# Patient Record
Sex: FEMALE | Race: ASIAN | NOT HISPANIC OR LATINO | Employment: FULL TIME | URBAN - METROPOLITAN AREA
[De-identification: names, ages, dates, MRNs, and addresses within clinical notes are randomized per-mention and may not be internally consistent; named-entity substitution may affect disease eponyms.]

---

## 2019-07-09 ENCOUNTER — RECORDS - HEALTHEAST (OUTPATIENT)
Dept: LAB | Facility: CLINIC | Age: 34
End: 2019-07-09

## 2019-07-09 LAB
ABO/RH(D): NORMAL
ABORH REPEAT: NORMAL
HCG SERPL-ACNC: 1532 MLU/ML (ref 0–4)

## 2019-07-11 ENCOUNTER — RECORDS - HEALTHEAST (OUTPATIENT)
Dept: LAB | Facility: CLINIC | Age: 34
End: 2019-07-11

## 2019-07-11 ENCOUNTER — RECORDS - HEALTHEAST (OUTPATIENT)
Dept: ADMINISTRATIVE | Facility: OTHER | Age: 34
End: 2019-07-11

## 2019-07-11 LAB — HCG SERPL-ACNC: 4085 MLU/ML (ref 0–4)

## 2019-07-12 ENCOUNTER — HOSPITAL ENCOUNTER (OUTPATIENT)
Dept: ULTRASOUND IMAGING | Facility: CLINIC | Age: 34
Discharge: HOME | End: 2019-07-12
Attending: OBSTETRICS & GYNECOLOGY

## 2019-07-12 ENCOUNTER — COMMUNICATION - HEALTHEAST (OUTPATIENT)
Dept: TELEHEALTH | Facility: CLINIC | Age: 34
End: 2019-07-12

## 2019-07-12 DIAGNOSIS — O20.9 BLEEDING IN EARLY PREGNANCY: ICD-10-CM

## 2019-08-09 ENCOUNTER — RECORDS - HEALTHEAST (OUTPATIENT)
Dept: ADMINISTRATIVE | Facility: OTHER | Age: 34
End: 2019-08-09

## 2019-08-29 ENCOUNTER — RECORDS - HEALTHEAST (OUTPATIENT)
Dept: ADMINISTRATIVE | Facility: OTHER | Age: 34
End: 2019-08-29

## 2019-08-29 LAB
HPV_EXT - HISTORICAL: NORMAL
PAP SMEAR - HIM PATIENT REPORTED: NORMAL

## 2019-10-10 ENCOUNTER — AMBULATORY - HEALTHEAST (OUTPATIENT)
Dept: MATERNAL FETAL MEDICINE | Facility: HOSPITAL | Age: 34
End: 2019-10-10

## 2019-10-10 DIAGNOSIS — O26.90 PREGNANCY, ANTEPARTUM, COMPLICATIONS: ICD-10-CM

## 2019-10-18 ENCOUNTER — AMBULATORY - HEALTHEAST (OUTPATIENT)
Dept: MATERNAL FETAL MEDICINE | Facility: HOSPITAL | Age: 34
End: 2019-10-18

## 2019-10-23 ENCOUNTER — OFFICE VISIT - HEALTHEAST (OUTPATIENT)
Dept: MATERNAL FETAL MEDICINE | Facility: HOSPITAL | Age: 34
End: 2019-10-23

## 2019-10-23 ENCOUNTER — RECORDS - HEALTHEAST (OUTPATIENT)
Dept: ADMINISTRATIVE | Facility: OTHER | Age: 34
End: 2019-10-23

## 2019-10-23 ENCOUNTER — RECORDS - HEALTHEAST (OUTPATIENT)
Dept: ULTRASOUND IMAGING | Facility: HOSPITAL | Age: 34
End: 2019-10-23

## 2019-10-23 DIAGNOSIS — O09.512 SUPERVISION OF ELDERLY PRIMIGRAVIDA IN SECOND TRIMESTER: ICD-10-CM

## 2019-10-23 DIAGNOSIS — O09.522 MULTIGRAVIDA OF ADVANCED MATERNAL AGE IN SECOND TRIMESTER: ICD-10-CM

## 2019-10-23 DIAGNOSIS — O26.90 PREGNANCY RELATED CONDITIONS, UNSPECIFIED, UNSPECIFIED TRIMESTER: ICD-10-CM

## 2019-10-23 DIAGNOSIS — O26.90 PREGNANCY, ANTEPARTUM, COMPLICATIONS: ICD-10-CM

## 2019-10-24 ENCOUNTER — RECORDS - HEALTHEAST (OUTPATIENT)
Dept: ADMINISTRATIVE | Facility: OTHER | Age: 34
End: 2019-10-24

## 2019-10-24 LAB — HBA1C MFR BLD: 4.9 % (ref 0–5.6)

## 2019-12-20 ENCOUNTER — RECORDS - HEALTHEAST (OUTPATIENT)
Dept: ADMINISTRATIVE | Facility: OTHER | Age: 34
End: 2019-12-20

## 2020-02-06 ENCOUNTER — RECORDS - HEALTHEAST (OUTPATIENT)
Dept: ADMINISTRATIVE | Facility: OTHER | Age: 35
End: 2020-02-06

## 2020-02-06 ENCOUNTER — RECORDS - HEALTHEAST (OUTPATIENT)
Dept: LAB | Facility: CLINIC | Age: 35
End: 2020-02-06

## 2020-02-06 LAB
ALT SERPL W P-5'-P-CCNC: 15 U/L (ref 0–45)
AST SERPL W P-5'-P-CCNC: 21 U/L (ref 0–40)
BASOPHILS # BLD AUTO: 0 THOU/UL (ref 0–0.2)
BASOPHILS NFR BLD AUTO: 0 % (ref 0–2)
CREAT SERPL-MCNC: 0.54 MG/DL (ref 0.6–1.1)
CREAT UR-MCNC: 82.1 MG/DL
EOSINOPHIL # BLD AUTO: 0 THOU/UL (ref 0–0.4)
EOSINOPHIL NFR BLD AUTO: 1 % (ref 0–6)
ERYTHROCYTE [DISTWIDTH] IN BLOOD BY AUTOMATED COUNT: 13.1 % (ref 11–14.5)
GFR SERPL CREATININE-BSD FRML MDRD: >60 ML/MIN/1.73M2
HCT VFR BLD AUTO: 37.9 % (ref 35–47)
HGB BLD-MCNC: 12.9 G/DL (ref 12–16)
LYMPHOCYTES # BLD AUTO: 1.5 THOU/UL (ref 0.8–4.4)
LYMPHOCYTES NFR BLD AUTO: 19 % (ref 20–40)
MCH RBC QN AUTO: 31.9 PG (ref 27–34)
MCHC RBC AUTO-ENTMCNC: 34 G/DL (ref 32–36)
MCV RBC AUTO: 94 FL (ref 80–100)
MONOCYTES # BLD AUTO: 0.6 THOU/UL (ref 0–0.9)
MONOCYTES NFR BLD AUTO: 7 % (ref 2–10)
NEUTROPHILS # BLD AUTO: 5.6 THOU/UL (ref 2–7.7)
NEUTROPHILS NFR BLD AUTO: 72 % (ref 50–70)
PLATELET # BLD AUTO: 204 THOU/UL (ref 140–440)
PMV BLD AUTO: 10.3 FL (ref 8.5–12.5)
PROTEIN, RANDOM URINE - HISTORICAL: 25 MG/DL
PROTEIN/CREAT RATIO, RANDOM UR: 0.3
RBC # BLD AUTO: 4.04 MILL/UL (ref 3.8–5.4)
URATE SERPL-MCNC: 4.3 MG/DL (ref 2–7.5)
WBC: 7.9 THOU/UL (ref 4–11)

## 2020-02-14 ENCOUNTER — RECORDS - HEALTHEAST (OUTPATIENT)
Dept: ADMINISTRATIVE | Facility: OTHER | Age: 35
End: 2020-02-14

## 2020-03-12 ENCOUNTER — RECORDS - HEALTHEAST (OUTPATIENT)
Dept: ADMINISTRATIVE | Facility: OTHER | Age: 35
End: 2020-03-12

## 2020-03-18 ASSESSMENT — MIFFLIN-ST. JEOR: SCORE: 1364.01

## 2020-03-20 ENCOUNTER — ANESTHESIA - HEALTHEAST (OUTPATIENT)
Dept: OBGYN | Facility: CLINIC | Age: 35
End: 2020-03-20

## 2020-03-20 ENCOUNTER — SURGERY - HEALTHEAST (OUTPATIENT)
Dept: OBGYN | Facility: CLINIC | Age: 35
End: 2020-03-20

## 2020-04-03 ENCOUNTER — RECORDS - HEALTHEAST (OUTPATIENT)
Dept: ADMINISTRATIVE | Facility: OTHER | Age: 35
End: 2020-04-03

## 2020-05-01 ENCOUNTER — RECORDS - HEALTHEAST (OUTPATIENT)
Dept: ADMINISTRATIVE | Facility: OTHER | Age: 35
End: 2020-05-01

## 2020-09-10 ENCOUNTER — RECORDS - HEALTHEAST (OUTPATIENT)
Dept: ADMINISTRATIVE | Facility: OTHER | Age: 35
End: 2020-09-10

## 2020-09-10 LAB
HPV_EXT - HISTORICAL: NORMAL
PAP SMEAR - HIM PATIENT REPORTED: NORMAL

## 2020-10-16 ENCOUNTER — OFFICE VISIT - HEALTHEAST (OUTPATIENT)
Dept: FAMILY MEDICINE | Facility: CLINIC | Age: 35
End: 2020-10-16

## 2020-10-16 DIAGNOSIS — Z13.228 SCREENING FOR METABOLIC DISORDER: ICD-10-CM

## 2020-10-16 DIAGNOSIS — Z00.00 ROUTINE GENERAL MEDICAL EXAMINATION AT A HEALTH CARE FACILITY: ICD-10-CM

## 2020-10-16 DIAGNOSIS — Z23 NEEDS FLU SHOT: ICD-10-CM

## 2020-10-16 DIAGNOSIS — Z12.4 SCREENING FOR CERVICAL CANCER: ICD-10-CM

## 2020-10-16 LAB
CHOLEST SERPL-MCNC: 212 MG/DL
FASTING STATUS PATIENT QL REPORTED: YES
HDLC SERPL-MCNC: 56 MG/DL
LDLC SERPL CALC-MCNC: 103 MG/DL
TRIGL SERPL-MCNC: 264 MG/DL

## 2020-10-16 ASSESSMENT — PATIENT HEALTH QUESTIONNAIRE - PHQ9: SUM OF ALL RESPONSES TO PHQ QUESTIONS 1-9: 2

## 2020-10-16 ASSESSMENT — ANXIETY QUESTIONNAIRES
7. FEELING AFRAID AS IF SOMETHING AWFUL MIGHT HAPPEN: NOT AT ALL
1. FEELING NERVOUS, ANXIOUS, OR ON EDGE: NOT AT ALL
2. NOT BEING ABLE TO STOP OR CONTROL WORRYING: SEVERAL DAYS
5. BEING SO RESTLESS THAT IT IS HARD TO SIT STILL: NOT AT ALL
4. TROUBLE RELAXING: NOT AT ALL
IF YOU CHECKED OFF ANY PROBLEMS ON THIS QUESTIONNAIRE, HOW DIFFICULT HAVE THESE PROBLEMS MADE IT FOR YOU TO DO YOUR WORK, TAKE CARE OF THINGS AT HOME, OR GET ALONG WITH OTHER PEOPLE: NOT DIFFICULT AT ALL
6. BECOMING EASILY ANNOYED OR IRRITABLE: NOT AT ALL
3. WORRYING TOO MUCH ABOUT DIFFERENT THINGS: NOT AT ALL
GAD7 TOTAL SCORE: 1

## 2020-10-16 ASSESSMENT — MIFFLIN-ST. JEOR: SCORE: 1244.03

## 2020-10-17 LAB — HBA1C MFR BLD: 5.5 %

## 2020-11-10 ENCOUNTER — RECORDS - HEALTHEAST (OUTPATIENT)
Dept: HEALTH INFORMATION MANAGEMENT | Facility: CLINIC | Age: 35
End: 2020-11-10

## 2021-05-27 ASSESSMENT — PATIENT HEALTH QUESTIONNAIRE - PHQ9: SUM OF ALL RESPONSES TO PHQ QUESTIONS 1-9: 2

## 2021-05-28 ASSESSMENT — ANXIETY QUESTIONNAIRES: GAD7 TOTAL SCORE: 1

## 2021-06-02 NOTE — PROGRESS NOTES
[facility]  Genetic Counseling Consult    Patient:  Felicia Mcallister YOB: 1985   Date of Service:  10/23/2019      Felicia Mcallister was seen at the [facility] for genetic consultation as part of her appointment for comprehensive ultrasound.  The indication for genetic counseling is advanced maternal age. Felicia was accompanied to today's appointment by the father of the pregnancy, Armando.       Impression/Plan:   1. Felicia had a cell-free fetal DNA test earlier in pregnancy, which was normal.    2. Felicia had a comprehensive (level II) ultrasound today.  Please see the ultrasound report for further details.    3. The patient declines genetic amniocentesis and additional maternal serum screening today.    Pregnancy History:   /Parity:    Age at Delivery: 34 y.o.  KAIT: 3/11/2020, by Last Menstrual Period  Gestational Age: 20w0d    No significant complications or exposures were reported in the current pregnancy.    Medical History:   Felicia simpson reported medical history is not expected to impact pregnancy management or risks to fetal development.       Family History:   A three-generation pedigree was obtained, and is scanned under the  Media  tab.   The following significant findings were reported by Felicia and Armando:    Armando reports that one of his nieces has a form of dwarfism and was born with a heart defect that she had to have surgically corrected when she was 3 months old. Armando was uncertain which form of dwarfism his niece has. We discussed that there are several forms of dwarfism and they have different inheritance patterns. The most common form of dwarfism, achondroplasia, is an autosomal dominant condition. It is most often de danial, with no family history of the condition. If this is the form Armando's niece has, it would not confer an increased risk to the current pregnancy. If Armando's niece has an  autosomal recessive form of dwarfism, there would be an increased chance for him to be a carrier for the condition and therefore a potential increased risk for the pregnancy to be affected. If more information is obtained regarding Armando's niece's diagnosis this should be revisited.     Otherwise, the reported family history is negative for multiple miscarriages, stillbirths, birth defects, intellectual disability, known genetic conditions, and consanguinity.       Carrier Screening:   The patient reports that the father of the pregnancy has  ancestry:     Cystic fibrosis is an autosomal recessive genetic condition that occurs with increased frequency in individuals of  ancestry and carrier screening for this condition is available.  In addition,  screening in the [facility] includes cystic fibrosis.    The patient reports that she is of  ancestry:      The hemoglobinopathies are a group of genetic blood diseases that occur with increased frequency in individuals of  ancestry and carrier screening for these conditions is available.  Carrier screening for the hemoglobinopathies includes a CBC with red blood cell indices, a ferritin level, and a quantitative hemoglobin electrophoresis or HPLC.  In addition,  screening in the [facility] includes many of the hemoglobinopathies.      Expanded carrier screening for mutations in a large panel of genes associated with autosomal recessive conditions including cystic fibrosis, spinal muscular atrophy, and others, is now available.      Carrier screening was beyond the scope of our discussion today.        Risk Assessment for Chromosome Conditions:   We explained that the risk for fetal chromosome abnormalities increases with maternal age. We discussed specific features of common chromosome abnormalities, including Down syndrome, trisomy 13, trisomy 18, and sex chromosome trisomies.      - At age 33 at midtrimester, the  risk to have a baby with Down syndrome is 1 in 274.     - At age 33 at midtrimester, the risk to have a baby with any chromosome abnormality is 1 in 135.       Felicia had maternal serum screening earlier in pregnancy.    Non-invasive Prenatal Testing (NIPT)    Maternal plasma cell-free DNA testing    Screens for fetal trisomy 21, trisomy 13, trisomy 18, and sex chromosome aneuploidy    First trimester ultrasound with nuchal translucency and nasal bone assessment was not performed in this pregnancy, to our knowledge.    Felicia had NIPT through [facility] earlier in pregnancy; we reviewed the results today, which are normal for chromosome 13, chromosome 18 and chromosome 21 (no aneuploidy detected)    Given the accuracy of this test, these results greatly decrease the chance for certain fetal chromosome abnormalities    We discussed the limitations of normal NIPT results    MSAFP (after 15 weeks for open neural tube defect screening) results were not available for our review today.         Testing Options:   We discussed the following options:   Non-invasive Prenatal Testing (NIPT)    Maternal plasma cell-free DNA testing; first trimester ultrasound with nuchal translucency and nasal bone assessment is recommended, when appropriate    Screens for fetal trisomy 21, trisomy 13, trisomy 18, and sex chromosome aneuploidy    Cannot screen for open neural tube defects; maternal serum AFP after 15 weeks is recommended       Comprehensive (Level II) ultrasound: Detailed ultrasound performed between 18-22 weeks gestation to screen for major birth defects and markers for aneuploidy.        We reviewed the benefits and limitations of this testing.  Screening tests provide a risk assessment specific to the pregnancy for certain fetal chromosome abnormalities, but cannot definitively diagnose or exclude a fetal chromosome abnormality.  Follow-up genetic counseling and consideration of diagnostic testing is recommended with  any abnormal screening result.     Diagnostic tests carry inherent risks- including risk of miscarriage- that require careful consideration.  These tests can detect fetal chromosome abnormalities with greater than 99% certainty.  Results can be compromised by maternal cell contamination or mosaicism, and are limited by the resolution of cytogenetic G-banding technology.  There is no screening nor diagnostic test that can detect all forms of birth defects or mental disability.    It was a pleasure to be involved with Felicia [facility]. Face-to-face time of the meeting was 25 minutes.      Sienna Zurita MS, [facility]  Licensed Genetic Counselor   [facility]   Maternal Fetal Medicine Centers  [email or web address]   [place] Office: [phone]   [facility] [phone]  [facility] Office: [phone]  Pager: [phone] Fax: [phone]

## 2021-06-02 NOTE — PROGRESS NOTES
"Please see \"Imaging\" tab under Chart Review for full report.  This ultrasound was performed in the [facility], and may be located under Care Everywhere.    Nicolasa Fuentes MD  Maternal Fetal Medicine    "

## 2021-06-04 VITALS — BODY MASS INDEX: 32.79 KG/M2 | WEIGHT: 167 LBS | HEIGHT: 60 IN

## 2021-06-05 VITALS
BODY MASS INDEX: 26.21 KG/M2 | HEIGHT: 61 IN | HEART RATE: 67 BPM | TEMPERATURE: 97.9 F | DIASTOLIC BLOOD PRESSURE: 70 MMHG | WEIGHT: 138.8 LBS | SYSTOLIC BLOOD PRESSURE: 100 MMHG

## 2021-06-07 NOTE — ANESTHESIA CARE TRANSFER NOTE
Last vitals:   Vitals:    20 0900   BP: 93/54   Pulse: 85   Resp: 16   Temp: 36.9  C (98.4  F)   SpO2:      Please see postop note from .  Duplicate note

## 2021-06-07 NOTE — ANESTHESIA POSTPROCEDURE EVALUATION
Patient: Felicia Mcallister  Procedure(s):   SECTION  Anesthesia type: regional    Patient location: PACU  Last vitals:   Vitals Value Taken Time   /55 3/20/2020  6:15 PM   Temp 37.3  C (99.1  F) 3/20/2020  5:33 PM   Pulse 77 3/20/2020  6:15 PM   Resp 14 3/20/2020  6:15 PM   SpO2 96 % 3/20/2020  6:15 PM     Post vital signs: stable  Level of consciousness: awake and responds to simple questions  Post-anesthesia pain: pain controlled  Post-anesthesia nausea and vomiting: no  Pulmonary: unassisted, return to baseline  Cardiovascular: stable and blood pressure at baseline  Hydration: adequate  Anesthetic events: no    QCDR Measures:  ASA# 11 - Alena-op Cardiac Arrest: ASA11B - Patient did NOT experience unanticipated cardiac arrest  ASA# 12 - Alena-op Mortality Rate: ASA12B - Patient did NOT die  ASA# 13 - PACU Re-Intubation Rate: NA - No ETT / LMA used for case  ASA# 10 - Composite Anes Safety: ASA10A - No serious adverse event    Additional Notes:

## 2021-06-07 NOTE — ANESTHESIA PROCEDURE NOTES
Peripheral Block    Patient location during procedure: OR  Start time: 3/20/2020 5:11 PM  End time: 3/20/2020 5:19 PM  post-op analgesia per surgeon order as noted in medical record  Staffing:  Performing  Anesthesiologist: Nicolasa Magaña MD  Preanesthetic Checklist  Completed: patient identified, site marked, risks, benefits, and alternatives discussed, timeout performed, consent obtained, airway assessed, oxygen available, suction available, emergency drugs available and hand hygiene performed  Peripheral Block  Block type: other, TAP  Prep: ChloraPrep  Patient position: supine  Patient monitoring: cardiac monitor, continuous pulse oximetry, heart rate and blood pressure  Laterality: bilateral  Injection technique: ultrasound guided    Ultrasound used to visualize needle placement in proximity to nerve being blocked: yes   US used to visualize anesthetic spread  Visualized anatomic structures normal  No Pathological Findings  Permanent ultrasound image captured for medical record  Sterile gel and probe cover used for ultrasound.  Needle  Needle type: Stimuplex   Needle gauge: 20G  Needle length: 4 in  no peripheral nerve catheter placed  Assessment  Injection assessment: no difficulty with injection

## 2021-06-07 NOTE — ANESTHESIA CARE TRANSFER NOTE
Last vitals:   Vitals:    03/20/20 1733   BP: 102/49   Pulse: 75   Resp: 12   Temp: 37.3  C (99.1  F)   SpO2: 96%     Patient's level of consciousness is awake  Spontaneous respirations: yes  Maintains airway independently: yes  Dentition unchanged: yes  Oropharynx: oropharynx clear of all foreign objects    QCDR Measures:  ASA# 20 - Surgical Safety Checklist: WHO surgical safety checklist completed prior to induction    PQRS# 430 - Adult PONV Prevention: 4558F - Pt received => 2 anti-emetic agents (different classes) preop & intraop  ASA# 8 - Peds PONV Prevention: NA - Not pediatric patient, not GA or 2 or more risk factors NOT present  PQRS# 424 - Alena-op Temp Management: NA - MAC anesthesia or case < 60 minutes  PQRS# 426 - PACU Transfer Protocol: - Transfer of care checklist used  ASA# 14 - Acute Post-op Pain: ASA14B - Patient did NOT experience pain >= 7 out of 10

## 2021-06-07 NOTE — ANESTHESIA PREPROCEDURE EVALUATION
Anesthesia Evaluation      Patient summary reviewed   No history of anesthetic complications     Airway   Mallampati: III  Neck ROM: full   Pulmonary - negative ROS and normal exam                          Cardiovascular - negative ROS and normal exam   Neuro/Psych - negative ROS     Endo/Other - negative ROS      GI/Hepatic/Renal - negative ROS           Dental - normal exam                        Anesthesia Plan  Planned anesthetic: epidural    ASA 2     Anesthetic plan and risks discussed with: patient and spouse    Post-op plan: routine recovery

## 2021-06-07 NOTE — ANESTHESIA PROCEDURE NOTES
Epidural Block    Patient location during procedure: OB  Time Called: 3/20/2020 4:28 AM  Reason for Block:labor epidural  Staffing:  Performing  Anesthesiologist: Kevin Bailey MD  Preanesthetic Checklist  Completed: patient identified, risks, benefits, and alternatives discussed, timeout performed, consent obtained, airway assessed, oxygen available, suction available, emergency drugs available and hand hygiene performed  Procedure  Patient position: sitting  Prep: ChloraPrep and site prepped and draped  Patient monitoring: continuous pulse oximetry, heart rate and blood pressure  Approach: midline  Location: L3-L4  Injection technique: KONSTANTIN saline  Number of Attempts:1  Needle  Needle type: Brent   Needle gauge: 18 G     Catheter in Space: 5  Assessment  Sensory level:  No complications

## 2021-06-07 NOTE — ANESTHESIA POSTPROCEDURE EVALUATION
Patient: Felicia Mcallister  * No procedures listed *  Anesthesia type: epidural    Patient location[place] [facility]  Last vitals: No vitals data found for the desired time range.    Post vital signs: stable  Level of consciousness: awake and responds to simple questions  Post-anesthesia pain: pain controlled  Post-anesthesia nausea and vomiting: no  Pulmonary: unassisted, return to baseline  Cardiovascular: stable and blood pressure at baseline  Hydration: adequate  Anesthetic events: no    QCDR Measures:  ASA# 11 - Alena-op Cardiac Arrest: ASA11B - Patient did NOT experience unanticipated cardiac arrest  ASA# 12 - Alena-op Mortality Rate: ASA12B - Patient did NOT die  ASA# 13 - PACU Re-Intubation Rate: ASA13B - Patient did NOT require a new airway mgmt  ASA# 10 - Composite Anes Safety: ASA10A - No serious adverse event    Additional Notes:

## 2021-06-07 NOTE — ANESTHESIA PREPROCEDURE EVALUATION
Anesthesia Evaluation      Patient summary reviewed   No history of anesthetic complications     Airway   Mallampati: II  Neck ROM: full   Pulmonary - normal exam                          Cardiovascular - negative ROS  Rhythm: regular  Rate: normal,         Neuro/Psych - negative ROS     Endo/Other    (+) pregnant     GI/Hepatic/Renal - negative ROS           Dental                         Anesthesia Plan  Planned anesthetic: epidural and peripheral nerve block  TAP block per surgeon request for POP  ASA 2     Anesthetic plan and risks discussed with: patient and spouse    Post-op plan: routine recovery

## 2021-06-16 PROBLEM — O48.0 POST-TERM PREGNANCY, 40-42 WEEKS OF GESTATION: Status: ACTIVE | Noted: 2020-03-20

## 2021-06-18 NOTE — PATIENT INSTRUCTIONS - HE
Patient Instructions by Pilar Troncoso MD at 10/16/2020  9:00 AM     Author: Pilar Troncoso MD Service: -- Author Type: Physician    Filed: 10/16/2020  9:29 AM Encounter Date: 10/16/2020 Status: Signed    : Pilar Troncoso MD (Physician)         Patient Education     Prevention Guidelines, Women Ages 18 to 39  Screening tests and vaccines are an important part of managing your health. A screening test is done to find possible disorders or diseases in people who don't have any symptoms. The goal is to find a disease early so lifestyle changes can be made and you can be watched more closely to reduce the risk of disease, or to detect it early enough to treat it most effectively. Screening tests are not considered diagnostic, but are used to determine if more testing is needed. Health counseling is essential, too. Below are guidelines for these, for women ages 18 to 39. Talk with your healthcare provider to make sure youre up-to-date on what you need.  Screening Who needs it How often   Alcohol misuse All women in this age group At routine exams   Blood pressure All women in this age group Yearly checkup if your blood pressure is normal  Normal blood pressure is less than 120/80 mm Hg  If your blood pressure reading is higher than normal, follow the advice of your healthcare provider   Breast cancer All women in this age group should talk with their healthcare providers about the need for clinical breast exams (CBE)1 Clinical breast exam every 3 years1   Cervical cancer Women ages 21 and older Women between ages 21 and 29 should have a Pap test every 3 years; women between ages 30 and 65 are advised to have a Pap test plus an HPV test every 5 years   Chlamydia Sexually active women ages 25 and younger, and women at increased risk for infection (such as having multiple sex partners) Every year if you're at risk or have symptoms   Depression All women in this age group At routine exams   Type 2 diabetes, prediabetes  All women with no symptoms who are overweight or obese and have 1 or more other risk factors for diabetes At least every 3 years. Also, testing for diabetes during pregnancy after the 24th week.    Type 2 diabetes, prediabetes All women diagnosed with gestational diabetes Lifelong testing every 3 years   Type 2 diabetes All women with prediabetes Every year   Gonorrhea Sexually active women at increased risk for infection At routine exams   Hepatitis C Anyone at increased risk At routine exams   HIV All women should be tested at least once for HIV between the ages of 13 and 64 At routine exams. Those with risk factors for HIV should be tested at least annually.    Obesity All women in this age group At routine exams   Syphilis Women at increased risk for infection should talk with their healthcare provider At routine exams   Tuberculosis Women at increased risk for infection should talk with their healthcare provider Ask your healthcare provider   Vision All women in this age group At least 1 complete exam in your 20s, and 2 in your 30s   Vaccine2 Who needs it How often   Chickenpox (varicella) All women in this age group who have no record of this infection or vaccine 2 doses; the second dose should be given 4 to 8 weeks after the first dose   Hepatitis A Women at increased risk for infection should talk with their healthcare provider 2 doses given at least 6 months apart   Hepatitis B Women at increased risk for infection should talk with their healthcare provider 3 doses over 6 months; second dose should be given 1 month after the first dose; the third dose should be given at least 2 months after the second dose and at least 4 months after the first dose   Haemophilus influenzae Type B (HIB) Women at increased risk for infection should talk with their healthcare provider 1 to 3 doses   Human papillomavirus (HPV) All women in this age group up to age 26 3 doses; the second dose should be given 1 to 2 months after  the first dose and the third dose given 6 months after the first dose   Influenza (flu) All women in this age group Once a year   Measles, mumps, rubella (MMR) All women in this age group who have no record of these infections or vaccines 1 or 2 doses   Meningococcal Women at increased risk for infection should talk with their healthcare provider 1 or more doses   Pneumococcal conjugate vaccine (PCV13) and pneumococcal polysaccharide vaccine (PPSV23) Women at increased risk for infection should talk with their healthcare provider PCV13: 1 dose ages 19 to 65 (protects against 13 types of pneumococcal bacteria)  PPSV23: 1 to 2 doses through age 64, or 1 dose at 65 or older (protects against 23 types of pneumococcal bacteria)      Tetanus/diphtheria/pertussis (Td/Tdap) booster All women in this age group Td every 10 years, or a one-time dose of Tdap instead of a Td booster after age 18, then Td every 10 years   Counseling Who needs it How often   BRCA gene mutation testing for breast and ovarian cancer susceptibility Women with increased risk for having gene mutation When your risk is known   Breast cancer and chemoprevention Women at high risk for breast cancer When your risk is known   Diet and exercise Women who are overweight or obese When diagnosed, and then at routine exams   Domestic violence Women at the age in which they are able to have children At routine exams   Sexually transmitted infection prevention Women who are sexually active At routine exams   Skin cancer Prevention of skin cancer in fair-skinned adults At routine exams   Use of tobacco and the health effects it can cause All women in this age group Every visit   1 According to the ACS, women ages 20 to 39 years should have a clinical breast exam (CBE) as part of their routine health exam every 3 years. Breast self-exams are an option for women starting in their 20s. But the USPSTF does not recommend CBE.  Date Last Reviewed: 10/1/2017    [id number]  [employer]. [address] [place] [address], [place] [address]. All rights reserved. This information is not intended as a substitute for professional medical care. Always follow your healthcare professional's instructions.

## 2021-06-20 ENCOUNTER — HEALTH MAINTENANCE LETTER (OUTPATIENT)
Age: 36
End: 2021-06-20

## 2021-06-29 NOTE — PROGRESS NOTES
Progress Notes by Pilar Troncoso MD at 10/16/2020  9:00 AM     Author: Pilar Troncoso MD Service: -- Author Type: Physician    Filed: 10/16/2020  9:29 AM Encounter Date: 10/16/2020 Status: Signed    : Pilar Troncoso MD (Physician)       FEMALE PREVENTATIVE EXAM    Assessment and Plan:       Felicia was seen today for annual exam.    Routine general medical examination at a health care facility  I discussed the following with the patient:   Adult Healthy Living: Importance of regular exercise  Healthy nutrition  Getting adequate sleep  Stress management  Supplement use  Herbal medications/alternative medical therapies      Screening for metabolic disorder  -     Glycosylated Hemoglobin A1c  -     Lipid Cascade    Screening for cervical cancer  Will do Release of information from Diane and  Edis    Needs flu shot  Traveling to [place] today , recommend flu shot  -     Influenza, Seasonal Quad, PF =/> 6months        Next follow up:  1 yr for annual physical    Immunization Reviewed and if needed ordered please see A/P    There are no preventive care reminders to display for this patient.    Immunization History   Administered Date(s) Administered   ? HPV Quadrivalent 03/05/2008, 10/21/2008, 01/20/2009   ? INFLUENZA,SEASONAL QUAD, PF, =/> 6months 10/16/2020   ? Influenza, Seasonal, Inj PF IIV3 09/25/2017   ? Influenza,seasonal, Inj IIV3 10/15/2012   ? MMR 03/05/2008, 10/21/2008   ? Td, Adult, Absorbed 11/18/2008, 07/07/2009   ? Tdap 10/21/2008, 09/28/2017   ? Typhoid, Inj, Inactive 09/28/2017     The following high BMI interventions were performed this visit: dietary management education, guidance, and counseling    I have had an Advance Directives discussion with the patient.    Subjective:   Chief Complaint: Felicia Mcallister is an 34 y.o. female here for a preventative health visit.     HPI:  Patient new to our practice like to establish care , had C section delivery 3/20 of healthy boy wt 9Ib 6 oz , currently  "not breast feeding , resume her cycle. 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  Patient's last menstrual period was 10/12/2020.     PHQ-9 Total Score: 2 (10/16/2020  8:00 AM)     EDSON-7 Total: 1 (10/16/2020  8:00 AM)       Social History     Social History Narrative   ? Not on file      Healthy Habits  Are you taking a daily aspirin? No  Do you typically exercising at least 40 min, 3-4 times per week?  NO  Do you usually eat at least 4 servings of fruit and vegetables a day, include whole grains and fiber and avoid regularly eating high fat foods? Yes  Have you had an eye exam in the past two years? NO  Do you see a dentist twice per year? NO  Do you have any concerns regarding sleep? YES-sometimes- not often    Safety Screen  If you own firearms, are they secured in a locked gun cabinet or with trigger locks? The patient does not own any firearms  No data recorded    No data recorded    Review of Systems:  Please see above.  The rest of the review of systems are negative for all systems.     Pap History:   Yes - updated in Problem List and Health Maintenance accordingly    Cancer Screening       Status Date      PAP SMEAR Next Due 9/16/2023      Done 9/16/2020 adefris and toppin          Patient Care Team:  Provider, No Primary Care as PCP - General        History     Reviewed By Date/Time Sections Reviewed    Analy Dueñas, RADHAMES 10/16/2020  8:49 AM Tobacco            Objective:   Vital Signs:   Visit Vitals  /70 (Patient Site: Left Arm, Patient Position: Sitting, Cuff Size: Adult Regular)   Pulse 67   Temp 97.9  F (36.6  C) (Oral)   Ht 5' 0.5\" (1.537 m)   Wt 138 lb 12.8 oz (63 kg)   LMP " 10/12/2020   BMI 26.66 kg/m         PHYSICAL EXAM  Physical Exam:  General Appearance:  Appears comfortable, Alert, cooperative, no distress,   Head: Normocephalic, without obvious abnormality, atraumatic  Eyes: PERRL, conjunctiva/corneas clear, EOM's intact, both eyes             Nose: Nares normal, no drainage   Throat: Lips, mucosa, and tongue normal; teeth and gums normal  Neck: Supple, symmetrical, trachea midline, no adenopathy;                      Lungs: Clear to auscultation bilaterally, respirations unlabored  eart: Regular rate and rhythm, S1 and S2 normal, no murmur, rubs or gallop  Abdomen: Soft, non-tender, bowel sounds active all four quadrants,   no masses, no organomegaly  Extremities: Extremities normal, atraumatic, no cyanosis or edema  Pulses: DP pulses are 1-2+ bilat.    Skin: no rashes or lesions  Neurologic: normal and equal strength bilat in upper and lower extremities                 Medication List          Accurate as of October 16, 2020  9:16 AM. If you have any questions, ask your nurse or doctor.            CONTINUE taking these medications    acetaminophen 500 MG tablet  Also known as: TYLENOL  INSTRUCTIONS: Take 2 tablets (1,000 mg total) by mouth every 6 (six) hours as needed for pain.        HAIR, SKIN AND NAILS ADVANCED ORAL  INSTRUCTIONS: Take by mouth.        ibuprofen 600 MG tablet  Also known as: ADVIL,MOTRIN  INSTRUCTIONS: Take 1 tablet (600 mg total) by mouth every 6 (six) hours as needed for pain.           STOP taking these medications    ondansetron 4 MG disintegrating tablet  Also known as: ZOFRAN-ODT  Stopped by: Pilar Troncoso MD     oxyCODONE 5 MG immediate release tablet  Also known as: ROXICODONE  Stopped by: Pilar Troncoso MD            Additional Screenings Completed Today:

## 2021-07-14 PROBLEM — Z34.90 PREGNANT: Status: RESOLVED | Noted: 2020-03-18 | Resolved: 2020-03-20

## 2021-10-10 ENCOUNTER — HEALTH MAINTENANCE LETTER (OUTPATIENT)
Age: 36
End: 2021-10-10

## 2021-12-01 LAB
ABO (EXTERNAL): NORMAL
HEMOGLOBIN (EXTERNAL): 13.4 G/DL (ref 11.1–15.9)
HEPATITIS B SURFACE ANTIGEN (EXTERNAL): NEGATIVE
HIV1+2 AB SERPL QL IA: NEGATIVE
PLATELET COUNT (EXTERNAL): 345 10E3/UL (ref 150–450)
RH (EXTERNAL): POSITIVE
RUBELLA ANTIBODY IGG (EXTERNAL): NORMAL
TREPONEMA PALLIDUM ANTIBODY (EXTERNAL): NONREACTIVE

## 2021-12-05 ENCOUNTER — HEALTH MAINTENANCE LETTER (OUTPATIENT)
Age: 36
End: 2021-12-05

## 2021-12-30 ENCOUNTER — MEDICAL CORRESPONDENCE (OUTPATIENT)
Dept: HEALTH INFORMATION MANAGEMENT | Facility: CLINIC | Age: 36
End: 2021-12-30
Payer: COMMERCIAL

## 2021-12-30 ENCOUNTER — TRANSFERRED RECORDS (OUTPATIENT)
Dept: HEALTH INFORMATION MANAGEMENT | Facility: CLINIC | Age: 36
End: 2021-12-30
Payer: COMMERCIAL

## 2021-12-31 ENCOUNTER — TRANSCRIBE ORDERS (OUTPATIENT)
Dept: MATERNAL FETAL MEDICINE | Facility: HOSPITAL | Age: 36
End: 2021-12-31
Payer: COMMERCIAL

## 2021-12-31 DIAGNOSIS — O26.90 PREGNANCY RELATED CONDITION, ANTEPARTUM: Primary | ICD-10-CM

## 2022-02-03 ENCOUNTER — PRE VISIT (OUTPATIENT)
Dept: MATERNAL FETAL MEDICINE | Facility: HOSPITAL | Age: 37
End: 2022-02-03
Payer: COMMERCIAL

## 2022-02-07 ENCOUNTER — ANCILLARY PROCEDURE (OUTPATIENT)
Dept: ULTRASOUND IMAGING | Facility: HOSPITAL | Age: 37
End: 2022-02-07
Attending: OBSTETRICS & GYNECOLOGY
Payer: COMMERCIAL

## 2022-02-07 ENCOUNTER — OFFICE VISIT (OUTPATIENT)
Dept: MATERNAL FETAL MEDICINE | Facility: HOSPITAL | Age: 37
End: 2022-02-07
Attending: OBSTETRICS & GYNECOLOGY
Payer: COMMERCIAL

## 2022-02-07 DIAGNOSIS — O09.522 MULTIGRAVIDA OF ADVANCED MATERNAL AGE IN SECOND TRIMESTER: Primary | ICD-10-CM

## 2022-02-07 DIAGNOSIS — O26.90 PREGNANCY RELATED CONDITION, ANTEPARTUM: ICD-10-CM

## 2022-02-07 PROCEDURE — 999N000069 HC STATISTIC GENETIC COUNSELING, < 16 MIN: Performed by: GENETIC COUNSELOR, MS

## 2022-02-07 PROCEDURE — 76811 OB US DETAILED SNGL FETUS: CPT

## 2022-02-07 PROCEDURE — 76811 OB US DETAILED SNGL FETUS: CPT | Mod: 26 | Performed by: OBSTETRICS & GYNECOLOGY

## 2022-02-07 PROCEDURE — 99207 PR NO CHARGE LOS: CPT | Performed by: OBSTETRICS & GYNECOLOGY

## 2022-02-07 NOTE — PROGRESS NOTES
[facility]  Genetic Counseling Consult    Patient:  Felicia Mcallister YOB: 1985   Date of Service:  22      Felicia Mcallister was seen at the [facility] for genetic consultation as part of her appointment for comprehensive ultrasound.  The indication for genetic counseling is advanced maternal age.       Impression/Plan:   Felicia had a cell-free fetal DNA test earlier in pregnancy, which was normal. Felicia had a comprehensive (level II) ultrasound today.  Please see the ultrasound report for further details. The patient declines genetic amniocentesis today.    Pregnancy History:   /Parity:    Age at Delivery: 36 year old  KAIT: 2022, by Ultrasound  Gestational Age: 18w3d    No significant complications or exposures were reported in the current pregnancy.    Family History:   A three-generation pedigree was obtained, and is scanned under the  Media  tab.   The following significant findings were reported by Felicia:    Felicia's partner has a niece with a skeletal dysplasia.Skeletal dysplasias are a highly variable group of more than 300 genetic conditions that are associated with abnormal cartilage, abnormal bone density, and bone growth resulting in abnormal shape,and disproportion of the long bones, chest, spine, and cranium. Skeletal  Dysplasia can be associated with autosomal dominant, autosomal recessive, and X-linked inheritance patterns. More information regarding this family member's exact skeletal dysplasia would be helpful in determining any potential recurrence risks for other family members.    Otherwise, the reported family history is negative for multiple miscarriages, stillbirths, birth defects, mental retardation, known genetic conditions, and consanguinity.       Carrier Screening:   The patient reports that the father of the pregnancy has  ancestry:      Cystic fibrosis is an  autosomal recessive genetic condition that occurs with increased frequency in individuals of  ancestry and carrier screening for this condition is available.  In addition,  screening in the [facility] includes cystic fibrosis.    The patient reports that she is of  ancestry:      The hemoglobinopathies are a group of genetic blood diseases that occur with increased frequency in individuals of  ancestry and carrier screening for these conditions is available.  Carrier screening for the hemoglobinopathies includes a CBC with red blood cell indices, a ferritin level, and a quantitative hemoglobin electrophoresis or HPLC.  In addition,  screening in the [facility] includes many of the hemoglobinopathies.      Expanded carrier screening for mutations in a large panel of genes associated with autosomal recessive conditions including cystic fibrosis, spinal muscular atrophy, and others, is now available.      The patient has declined the carrier screening options reviewed today.       Risk Assessment for Chromosome Conditions:   We explained that the risk for fetal chromosome abnormalities increases with maternal age. We discussed specific features of common chromosome abnormalities, including Down syndrome, trisomy 13, trisomy 18, and sex chromosome trisomies.      At age 36 at delivery, the midtrimester risk to have a baby with Down syndrome is 1 in 129.     At age 36 at delivery, the midtrimester risk to have a baby with any chromosome abnormality is 1 in 65.     Felicia had aneuploidy screening earlier in pregnancy.     Non-invasive Prenatal Testing (NIPT)/Cell Free Fetal DNA testing    Maternal plasma cell-free DNA testing    Screens for fetal trisomy 21, trisomy 13, trisomy 18, and sex chromosome aneuploidy    Felicia reported that she had a NIPT test earlier in pregnancy  which was low risk for aneuploidy involving chromosome 13, chromosome 18 and chromosome 21, and the  sex chromosomes    Given the accuracy of this test, these results greatly decrease the chance for certain fetal chromosome abnormalities    We discussed the limitations of normal NIPT results      Testing Options:   We discussed the following options:   Genetic Amniocentesis    Invasive procedure typically performed in the second trimester by which amniotic fluid is obtained for the purpose of chromosome analysis and/or other prenatal genetic analysis    Diagnostic results; >99% sensitivity for fetal chromosome abnormalities    AFAFP measurement tests for open neural tube defects     Comprehensive (Level II) ultrasound: Detailed ultrasound performed between 18-22 weeks gestation to screen for major birth defects and markers for aneuploidy.    We reviewed the benefits and limitations of this testing.  Screening tests provide a risk assessment specific to the pregnancy for certain fetal chromosome abnormalities, but cannot definitively diagnose or exclude a fetal chromosome abnormality.  Follow-up genetic counseling and consideration of diagnostic testing is recommended with any abnormal screening result. Diagnostic tests carry inherent risks- including risk of miscarriage- that require careful consideration.  These tests can detect fetal chromosome abnormalities with greater than 99% certainty.  Results can be compromised by maternal cell contamination or mosaicism, and are limited by the resolution of cytogenetic G-banding technology.  There is no screening nor diagnostic test that can detect all forms of birth defects or mental disability.    It was a pleasure to be involved with [facility]. Face-to-face time of the meeting was 15 minutes.    Holley Meza MS, [facility]  Maternal Fetal Medicine  [facility]  Phone:[phone]

## 2022-02-07 NOTE — PROGRESS NOTES
"Please see \"Imaging\" tab under \"Chart Review\" for details of today's visit.    Estevan Miner    "

## 2022-05-18 ENCOUNTER — TRANSFERRED RECORDS (OUTPATIENT)
Dept: HEALTH INFORMATION MANAGEMENT | Facility: CLINIC | Age: 37
End: 2022-05-18
Payer: COMMERCIAL

## 2022-05-19 ENCOUNTER — TELEPHONE (OUTPATIENT)
Dept: EDUCATION SERVICES | Facility: CLINIC | Age: 37
End: 2022-05-19
Payer: COMMERCIAL

## 2022-05-19 NOTE — TELEPHONE ENCOUNTER
External - Efren   Referring Provider: Dr. To  DX: GDM     Ref./rec. Were received in DM Consult folder on 05.18.2022 @ 11:15 am

## 2022-05-24 ENCOUNTER — ALLIED HEALTH/NURSE VISIT (OUTPATIENT)
Dept: EDUCATION SERVICES | Facility: CLINIC | Age: 37
End: 2022-05-24
Payer: COMMERCIAL

## 2022-05-24 VITALS — BODY MASS INDEX: 31.19 KG/M2 | WEIGHT: 162.4 LBS

## 2022-05-24 DIAGNOSIS — O24.419 GESTATIONAL DIABETES MELLITUS (GDM), ANTEPARTUM, GESTATIONAL DIABETES METHOD OF CONTROL UNSPECIFIED: Primary | ICD-10-CM

## 2022-05-24 PROCEDURE — G0108 DIAB MANAGE TRN  PER INDIV: HCPCS

## 2022-05-24 RX ORDER — PRENATAL VIT/IRON FUM/FOLIC AC 27MG-0.8MG
1 TABLET ORAL DAILY
COMMUNITY
End: 2023-05-18

## 2022-05-24 NOTE — PROGRESS NOTES
Diabetes Self-Management Education & Support    SUBJECTIVE/OBJECTIVE:  Presents for education related to gestational diabetes.    Accompanied by: Self  Gestational weeks: 33  Hospital planned for delivery: [facility]  Next OB Visit Date:  (1 week)  Number of previous pregnancies: 1  Had any babies over 9 lbs: Yes  Previously had Gestational Diabetes: No  Have you ever had thyroid problems or taken thyroid medication?: No  Do you use tobacco products?: No  Do you drink beer, wine or hard liquor?: No    Cultural Influences/Ethnic Background:  Not  or       Estimated Date of Delivery: 2022  C/S    1 hour OGTT  157    3 hour OGTT    Fasting  77  1 hour  185  2 hour  157  3 hour  No results found for: GTTG3    Lifestyle and Health Behaviors:  Pre-pregnancy weight (lbs): 138  Barrier to exercise: None  Cultural/Tenriism diet restrictions?: No  Meal planning/habits: None  Meals include: Breakfast, Lunch, Dinner, Afternoon Snack  Breakfast: 10 am will usualy have a banana or a bowl of cereal  Lunch: 1 PM: Rice-typically at least 2 cups, some type of meat  Dinner: 6 PM: Same as lunch only with this meal she may also have some type of vegetables  Snacks: In the afternoon might have an apple or an orange and at bedtime usually will have 1 cup of whole milk.  Beverages: Water, Milk, Soda (If she does have soda it is usually only 1/2 can-not often)  How many servings of fruits/vegetables per day: -1  Biggest challenges to healthy eating: Portion control  Pre- vitamin?: Yes  Supplements?: Yes  List supplements currently taking: Vitamin D3  Experiencing nausea?: No  Experiencing heartburn?: No    Healthy Coping:  Emotional response to diabetes: Ready to learn  Informal Support system:: Family  Stage of change: PREPARATION (Decided to change - considering how)    Current Management:  Taking medications for gestational diabetes?: No    ASSESSMENT:  Felicia is a very pleasant 36-year-old who comes to clinic  today for initial education regarding a new diagnosis of gestational diabetes.  She arrived today unaccompanied.  This is her second pregnancy, has a 2-year-old son at home, and her estimated date of delivery is 2022 via .  Per reports she did not have gestational diabetes with child #1.  Prior to her visit today her most recent office visit notes were reviewed.    During our conversation today learned her OB/GYN provider had ordered both diabetes testing supplies as well as keto sticks prior to our appointment today.  Unfortunately she did not bring her meter with her.  We did however go over frequency of testing and target glucose for both fasting and 1 hour postmeal.    INTERVENTION:   Patient's blood glucose reading today was 115 mg/dL. This morning    Educational topics covered today:  GDM diagnosis, pathophysiology, Risks and Complications of GDM, Means of controlling GDM, Using a Blood Glucose Monitor, Blood Glucose Goals, Logging and Interpreting Glucose Results, Ketone Testing, When to Call a Diabetes Educator or OB Provider, Healthy Eating During Pregnancy, Counting Carbohydrates, Meal Planning for GDM, and Physical Activity    Educational materials provided today:   [place] Understanding Gestational Diabetes  GDM Log Book  Sharps Disposal  Care After Delivery  Accu-Chek Guide Me meter kit    Pt verbalized understanding of concepts discussed and recommendations provided today.     PLAN:  Check glucose 4 times daily, before breakfast and 1 hour after each meal.     Check Ketones daily for one week, if negative, reduce testing to once a week.     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  Encouraged/strongly recommended walking daily-specifically after meals-10 to 20 minutes.    Meal plan: 30 carbs at breakfast, 60 carbs at lunch, 60 carbs at supper, 30 carbs at 3 snacks a day.  Follow consistent CHO meal plan,  eat CHO and protein/fat at all meals/snacks.    Call/e-mail/MyChart message diabetes educator if 3 or more blood sugars are above the goal in 1 week, if ketones are positive, or with questions/concerns.    Thank you,  Amber Martin RN [facility]  Certified Diabetes Care and   Visit type : DSMT      Time Spent: 60 minutes  Encounter Type: Individual    Any diabetes medication dose changes were made via the CDE Protocol and Collaborative Practice Agreement with the patient's OB/GYN provider. A copy of this encounter was shared with the provider.

## 2022-05-24 NOTE — PATIENT INSTRUCTIONS
1. Check urine for ketones in the morning. Your goal is negative or trace ketones.      If you have ketones in your urine it means you are not eating enough before you go to bed. Eat a larger bedtime snack and include protein.     2. Test blood sugar 4 times per day. Each morning before breakfast and 1 hour after meals.        Blood sugar goals:       Before breakfast: less 95      1 hour after meals: less 140    3. Eat 3 meals and 3 snacks per day according to the meal plan.   Breakfast 30 grams of carbohydrates, lunch and dinner 60 grams of carbohydrates  Snack before bed 30 grams of carbohydrate    Limit ANY portion of rice or pasta/noodles to no more than 1 cup and only 1 serving of either for a meal      4. Staying active after meals helps to decrease blood sugar.    5. Keep a detailed food and blood sugar record and note ketone levels.

## 2022-05-24 NOTE — LETTER
2022         RE: Felicia Mcallister  [address] [address]        Dear Colleague,    Thank you for referring your patient, Felicia Mcallister, to the [facility]. Please see a copy of my visit note below.    Diabetes Self-Management Education & Support    SUBJECTIVE/OBJECTIVE:  Presents for education related to gestational diabetes.         Cultural Influences/Ethnic Background:  Not  or       Estimated Date of Delivery: 2022    1 hour OGTT  157    3 hour OGTT    Fasting  77  1 hour  185  2 hour  157  3 hour  No results found for: GTTG3    Lifestyle and Health Behaviors:       Healthy Coping:       Current Management:       ASSESSMENT:  Felicia is a very pleasant 36-year-old who comes to clinic today for initial education regarding a new diagnosis of gestational diabetes.  She arrived today unaccompanied.  This is her second pregnancy, has a 2-year-old son at home, and her estimated date of delivery is 2022 via .  Per reports she did not have gestational diabetes with child #1.  Prior to her visit today her most recent office visit notes were reviewed.    During our conversation today learned her OB/GYN provider had ordered both diabetes testing supplies as well as keto sticks prior to our appointment today.  Unfortunately she did not bring her meter with her.  We did however go over frequency of testing and target glucose for both fasting and 1 hour postmeal.    INTERVENTION:      Educational topics covered today:  GDM diagnosis, pathophysiology, Risks and Complications of GDM, Means of controlling GDM, Using a Blood Glucose Monitor, Blood Glucose Goals, Logging and Interpreting Glucose Results, Ketone Testing, When to Call a Diabetes Educator or OB Provider, Healthy Eating During Pregnancy, Counting Carbohydrates, Meal Planning for GDM, and Physical Activity    Educational materials provided today:   [place] Understanding Gestational  Diabetes  GDM Log Book  Sharps Disposal  Care After Delivery  Accu-Chek Guide Me meter kit    Pt verbalized understanding of concepts discussed and recommendations provided today.     PLAN:  Check glucose 4 times daily, before breakfast and 1 hour after each meal.     Check Ketones daily for one week, if negative, reduce testing to once a week.     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  Encouraged and strongly recommended walking daily especially after meals-ideally 10 to 20 minutes.    Meal plan: 30 carbs at breakfast, 60 carbs at lunch, 60 carbs at supper, 30 carbs at 3 snacks a day.  Follow consistent CHO meal plan, eat CHO and protein/fat at all meals/snacks.    Call/e-mail/MyChart message diabetes educator if 3 or more blood sugars are above the goal in 1 week, if ketones are positive, or with questions/concerns.    Thank you,  Amber Martin RN [facility]  Certified Diabetes Care and   Visit type : DSMT      Time Spent: 60 minutes  Encounter Type: Individual    Any diabetes medication dose changes were made via the CDE Protocol and Collaborative Practice Agreement with the patient's OB/GYN provider. A copy of this encounter was shared with the provider.

## 2022-06-02 ENCOUNTER — TELEPHONE (OUTPATIENT)
Dept: EDUCATION SERVICES | Facility: CLINIC | Age: 37
End: 2022-06-02
Payer: COMMERCIAL

## 2022-06-02 NOTE — TELEPHONE ENCOUNTER
GDM patient calling with high bg concerns. BG ranging between 171-195 lunch the last 3 days. Please advise on what to do.

## 2022-06-02 NOTE — TELEPHONE ENCOUNTER
Spoke w/ pt.   She reports lunch elevations are with >1 cup of white rice. Also eating meat, non-starchy vegetables and sometimes brownies. She does not like brown rice. Discussed trying to limit to 1 cup of rice and increase protein and vegetables so she is not too hungry. Pt is already active at work after lunch, reports walking a lot at work.   Pt will try her best. If still elevating at appt on 6/8/22 advised will start insulin at that time. Pt verbalized understanding. She will call back sooner w/ any concerns.       6/2: --,   6/1: 94, --, 171, 127  5/31: 90, --, 134, 125  5/30: no checks  5/29: no checks   5/28: no checks   5/27: 92, 127, 195, 89  5/26: 108, --, 138, 148  5/25: --, --, 188, 108

## 2022-06-08 ENCOUNTER — ALLIED HEALTH/NURSE VISIT (OUTPATIENT)
Dept: EDUCATION SERVICES | Facility: CLINIC | Age: 37
End: 2022-06-08
Payer: COMMERCIAL

## 2022-06-08 VITALS — WEIGHT: 164 LBS | BODY MASS INDEX: 31.5 KG/M2

## 2022-06-08 DIAGNOSIS — O24.419 GESTATIONAL DIABETES MELLITUS (GDM), ANTEPARTUM, GESTATIONAL DIABETES METHOD OF CONTROL UNSPECIFIED: Primary | ICD-10-CM

## 2022-06-08 PROCEDURE — G0108 DIAB MANAGE TRN  PER INDIV: HCPCS | Mod: AE

## 2022-06-08 NOTE — PROGRESS NOTES
Diabetes Self-Management Education & Support    SUBJECTIVE/OBJECTIVE:  Presents for education related to gestational diabetes.    Accompanied by: Self  Diabetes management related comments/concerns: some higher readings after meals  Gestational weeks: 35 weeks 5 days  Number of previous pregnancies: 1  Had any babies over 9 lbs: Yes  Previously had Gestational Diabetes: No  Have you ever had thyroid problems or taken thyroid medication?: No  Heart disease, mitral valve prolapse or rheumatic fever?: No  Hypertension : No  High Cholesterol: No  High Triglycerides: No  Do you use tobacco products?: No  Do you drink beer, wine or hard liquor?: No    Cultural Influences/Ethnic Background:  Not  or     Wt 74.4 kg (164 lb)   LMP 10/04/2021 (Approximate)   BMI 31.50 kg/m        Estimated Date of Delivery: 2022, scheduled  22    Blood Glucose/Ketone Log: Highs noted:  Post bxjft-193-ssq much rice  Post kyzji-899-xk much rice  Post lunch-187-ice cream at work, AC was out  Fasting--ate during night before both times    Lifestyle and Health Behaviors:  Exercise:: Yes  Barrier to exercise: Time, Physical limitation  Cultural/Tenriism diet restrictions?: No  Meal planning/habits: Avoiding sweets, Smaller portions  How many times a week on average do you eat food made away from home (restaurant/take-out)?: 1  Meals include: Breakfast, Lunch, Dinner, Evening Snack  Breakfast: cereal-does not always eat  Lunch: rice, vegetables and meat  Dinner: rice vegetables and meat  Snacks: fruit, milk  Beverages: Water, Milk  How many servings of fruits/vegetables per day: 2  Pre-rolly vitamin?: Yes  Supplements?: Yes  List supplements currently taking: vitamin D  Experiencing nausea?: No  Experiencing heartburn?: No    Healthy Coping:  Emotional response to diabetes: Ready to learn  Informal Support system:: Family    Current Management:  Taking medications for gestational diabetes?: No  Difficulty  "affording diabetes testing supplies?: No    ASSESSMENT:    Felicia is here alone today for her follow up on Gestational Diabetes.  Stated she is feeling well, she is tired as she amado not normally get up this early.    She has been working on cutting back on her rice portions.  Stated it is harder at lunch when she does not eat anything in the morning.  Encouraged her to try something small, piece of fruit or peanut butter toast daily.  She will occasionally eat breakfast, having cereal, her lunch readings are better these days.  She does have a glass of milk before bed at night.  Two nights this week she work up in the middle of the night and ate chicken and vegetables, resulting in higher fasting readings.  On Tuesday her work provided ice cream as the AC was not working.  Stated she was really hot and \"had\" to eat it.    Her higher readings are noted below:  Post rjdpy-248-mcy much rice  Post nciki-339-el much rice  Post lunch-187-ice cream at work, AC was out  Fasting-99/97-ate during night before both times  Her high readings are all explainable, will not be starting her on insulin today.    All additional questions and concerns addressed today.    Today will be patient's last visit.  Discussed continuing to check glucose 4 times a day and following meal plan until the day of delivery.    After you deliver the baby, it is suggested to check your blood sugar occasionally (1 - 2 times per week) for 6 weeks.   When you are not pregnant, normal blood sugars are:     Fasting (before eating anything in the morning)  - under 100 mg/dl  2 hours after meals under 140  The American Diabetes Association recommends:   a glucose tolerance test 6 weeks after you deliver the baby.      a hemoglobin Alc test yearly after delivery.  The Alc test is a 2-3 month average blood sugar reading.       Discuss getting these tests with your provider.    After delivery, and your provider has said that it is safe to be active, work toward " getting 150 minutes each week of physical activity to decrease your risk of  getting type 2 diabetes.    Maintaining a healthy body weight will also decrease your risk of getting type 2 diabetes.       INTERVENTION:  Educational topics covered today:  What to expect after delivery, Future testing for Type 2 diabetes (2 hour OGTT at 6 week post-partum check-up and annual fasting blood glucose level), Risk of GDM and planning ahead for future pregnancies, Recommended lifestyle interventions for reducing the risk of Type 2 Diabetes, When to Call a Diabetes Educator or OB Provider    Educational Materials provided today:  [employer] Preventing Diabetes    PLAN:  Check glucose 4 times daily.  Check ketones once a week when readings are consistently negative.  Continue with recommended physical activity.  Continue to follow recommended meal plan: 20-30 carbs at breakfast, 45-60 carbs at lunch, 45-60 carbs at supper, 20-30 carbs at snacks.  Follow consistent CHO meal plan, eat CHO and protein/fat at all meals/snacks.    Call/e-mail/MyChart message diabetes educator if 3 or more blood sugars are above the goal in 1 week or if ketones are positive.     The service provided today was under the supervising provider, Jaky Mcmillan, who was available if needed.     Time Spent: 30 minutes  Encounter Type: Individual    Any diabetes medication dose changes were made via the CDE Protocol and Collaborative Practice Agreement with the patient's OB/GYN provider. A copy of this encounter was shared with the provider.

## 2022-06-08 NOTE — LETTER
2022         RE: Felicia Mcallister  [address] [place] [address]        Dear Colleague,    Thank you for referring your patient, Felicia Mcallister, to the [facility]. Please see a copy of my visit note below.    Diabetes Self-Management Education & Support    SUBJECTIVE/OBJECTIVE:  Presents for education related to gestational diabetes.    Accompanied by: Self  Diabetes management related comments/concerns: some higher readings after meals  Gestational weeks: 35 weeks 5 days  Number of previous pregnancies: 1  Had any babies over 9 lbs: Yes  Previously had Gestational Diabetes: No  Have you ever had thyroid problems or taken thyroid medication?: No  Heart disease, mitral valve prolapse or rheumatic fever?: No  Hypertension : No  High Cholesterol: No  High Triglycerides: No  Do you use tobacco products?: No  Do you drink beer, wine or hard liquor?: No    Cultural Influences/Ethnic Background:  Not  or     Wt 74.4 kg (164 lb)   LMP 10/04/2021 (Approximate)   BMI 31.50 kg/m        Estimated Date of Delivery: 2022, scheduled  22    Blood Glucose/Ketone Log: Highs noted:  Post xupos-543-dxy much rice  Post nwoyd-981-gu much rice  Post lunch-187-ice cream at work, AC was out  Fasting--ate during night before both times    Lifestyle and Health Behaviors:  Exercise:: Yes  Barrier to exercise: Time, Physical limitation  Cultural/[place] diet restrictions?: No  Meal planning/habits: Avoiding sweets, Smaller portions  How many times a week on average do you eat food made away from home (restaurant/take-out)?: 1  Meals include: Breakfast, Lunch, Dinner, Evening Snack  Breakfast: cereal-does not always eat  Lunch: rice, vegetables and meat  Dinner: rice vegetables and meat  Snacks: fruit, milk  Beverages: Water, Milk  How many servings of fruits/vegetables per day: 2  Pre- vitamin?: Yes  Supplements?: Yes  List supplements currently  "taking: vitamin D  Experiencing nausea?: No  Experiencing heartburn?: No    Healthy Coping:  Emotional response to diabetes: Ready to learn  Informal Support system:: Family    Current Management:  Taking medications for gestational diabetes?: No  Difficulty affording diabetes testing supplies?: No    ASSESSMENT:    Felicia is here alone today for her follow up on Gestational Diabetes.  Stated she is feeling well, she is tired as she amado not normally get up this early.    She has been working on cutting back on her rice portions.  Stated it is harder at lunch when she does not eat anything in the morning.  Encouraged her to try something small, piece of fruit or peanut butter toast daily.  She will occasionally eat breakfast, having cereal, her lunch readings are better these days.  She does have a glass of milk before bed at night.  Two nights this week she work up in the middle of the night and ate chicken and vegetables, resulting in higher fasting readings.  On Tuesday her work provided ice cream as the AC was not working.  Stated she was really hot and \"had\" to eat it.    Her higher readings are noted below:  Post rnkmw-431-dlh much rice  Post ioekq-794-ws much rice  Post lunch-187-ice cream at work, AC was out  Fasting-99/97-ate during night before both times  Her high readings are all explainable, will not be starting her on insulin today.    All additional questions and concerns addressed today.    Today will be patient's last visit.  Discussed continuing to check glucose 4 times a day and following meal plan until the day of delivery.    After you deliver the baby, it is suggested to check your blood sugar occasionally (1 - 2 times per week) for 6 weeks.   When you are not pregnant, normal blood sugars are:     Fasting (before eating anything in the morning)  - under 100 mg/dl  2 hours after meals under 140  The American Diabetes Association recommends:   a glucose tolerance test 6 weeks after you deliver " the baby.      a hemoglobin Alc test yearly after delivery.  The Alc test is a 2-3 month average blood sugar reading.       Discuss getting these tests with your provider.    After delivery, and your provider has said that it is safe to be active, work toward getting 150 minutes each week of physical activity to decrease your risk of  getting type 2 diabetes.    Maintaining a healthy body weight will also decrease your risk of getting type 2 diabetes.       INTERVENTION:  Educational topics covered today:  What to expect after delivery, Future testing for Type 2 diabetes (2 hour OGTT at 6 week post-partum check-up and annual fasting blood glucose level), Risk of GDM and planning ahead for future pregnancies, Recommended lifestyle interventions for reducing the risk of Type 2 Diabetes, When to Call a Diabetes Educator or OB Provider    Educational Materials provided today:  [employer] Preventing Diabetes    PLAN:  Check glucose 4 times daily.  Check ketones once a week when readings are consistently negative.  Continue with recommended physical activity.  Continue to follow recommended meal plan: 20-30 carbs at breakfast, 45-60 carbs at lunch, 45-60 carbs at supper, 20-30 carbs at snacks.  Follow consistent CHO meal plan, eat CHO and protein/fat at all meals/snacks.    Call/e-mail/Serviohart message diabetes educator if 3 or more blood sugars are above the goal in 1 week or if ketones are positive.     The service provided today was under the supervising provider, Jaky Mcmillan, who was available if needed.     Time Spent: 30 minutes  Encounter Type: Individual    Any diabetes medication dose changes were made via the CDE Protocol and Collaborative Practice Agreement with the patient's OB/GYN provider. A copy of this encounter was shared with the provider.

## 2022-06-15 LAB — GROUP B STREPTOCOCCUS (EXTERNAL): NEGATIVE

## 2022-06-28 ENCOUNTER — LAB (OUTPATIENT)
Dept: LAB | Facility: CLINIC | Age: 37
End: 2022-06-28
Payer: COMMERCIAL

## 2022-06-28 DIAGNOSIS — Z20.822 ENCOUNTER FOR LABORATORY TESTING FOR COVID-19 VIRUS: ICD-10-CM

## 2022-06-28 PROCEDURE — U0003 INFECTIOUS AGENT DETECTION BY NUCLEIC ACID (DNA OR RNA); SEVERE ACUTE RESPIRATORY SYNDROME CORONAVIRUS 2 (SARS-COV-2) (CORONAVIRUS DISEASE [COVID-19]), AMPLIFIED PROBE TECHNIQUE, MAKING USE OF HIGH THROUGHPUT TECHNOLOGIES AS DESCRIBED BY CMS-2020-01-R: HCPCS

## 2022-06-28 PROCEDURE — U0005 INFEC AGEN DETEC AMPLI PROBE: HCPCS

## 2022-06-29 LAB — SARS-COV-2 RNA RESP QL NAA+PROBE: NEGATIVE

## 2022-07-01 ENCOUNTER — ANESTHESIA (OUTPATIENT)
Dept: OBGYN | Facility: CLINIC | Age: 37
End: 2022-07-01
Payer: COMMERCIAL

## 2022-07-01 ENCOUNTER — ANCILLARY PROCEDURE (OUTPATIENT)
Dept: ULTRASOUND IMAGING | Facility: CLINIC | Age: 37
End: 2022-07-01
Attending: ANESTHESIOLOGY
Payer: COMMERCIAL

## 2022-07-01 ENCOUNTER — ANESTHESIA EVENT (OUTPATIENT)
Dept: OBGYN | Facility: CLINIC | Age: 37
End: 2022-07-01
Payer: COMMERCIAL

## 2022-07-01 ENCOUNTER — HOSPITAL ENCOUNTER (INPATIENT)
Facility: CLINIC | Age: 37
LOS: 2 days | Discharge: HOME HEALTH | End: 2022-07-03
Attending: OBSTETRICS & GYNECOLOGY | Admitting: OBSTETRICS & GYNECOLOGY
Payer: COMMERCIAL

## 2022-07-01 PROBLEM — O24.410 DIET CONTROLLED GESTATIONAL DIABETES MELLITUS (GDM): Status: ACTIVE | Noted: 2022-07-01

## 2022-07-01 PROBLEM — O48.0 POST-TERM PREGNANCY, 40-42 WEEKS OF GESTATION: Status: RESOLVED | Noted: 2020-03-20 | Resolved: 2022-07-01

## 2022-07-01 PROBLEM — O34.219 PREVIOUS CESAREAN DELIVERY, ANTEPARTUM CONDITION OR COMPLICATION: Status: ACTIVE | Noted: 2022-07-01

## 2022-07-01 LAB
ABO/RH(D): NORMAL
ANTIBODY SCREEN: NEGATIVE
BASOPHILS # BLD AUTO: 0 10E3/UL (ref 0–0.2)
BASOPHILS NFR BLD AUTO: 0 %
EOSINOPHIL # BLD AUTO: 0 10E3/UL (ref 0–0.7)
EOSINOPHIL NFR BLD AUTO: 0 %
ERYTHROCYTE [DISTWIDTH] IN BLOOD BY AUTOMATED COUNT: 13.2 % (ref 10–15)
GLUCOSE BLDC GLUCOMTR-MCNC: 106 MG/DL (ref 70–99)
HCT VFR BLD AUTO: 37.8 % (ref 35–47)
HGB BLD-MCNC: 12.3 G/DL (ref 11.7–15.7)
IMM GRANULOCYTES # BLD: 0.1 10E3/UL
IMM GRANULOCYTES NFR BLD: 1 %
LYMPHOCYTES # BLD AUTO: 1.8 10E3/UL (ref 0.8–5.3)
LYMPHOCYTES NFR BLD AUTO: 25 %
MCH RBC QN AUTO: 30.4 PG (ref 26.5–33)
MCHC RBC AUTO-ENTMCNC: 32.5 G/DL (ref 31.5–36.5)
MCV RBC AUTO: 93 FL (ref 78–100)
MONOCYTES # BLD AUTO: 0.6 10E3/UL (ref 0–1.3)
MONOCYTES NFR BLD AUTO: 8 %
NEUTROPHILS # BLD AUTO: 4.7 10E3/UL (ref 1.6–8.3)
NEUTROPHILS NFR BLD AUTO: 66 %
NRBC # BLD AUTO: 0 10E3/UL
NRBC BLD AUTO-RTO: 0 /100
PLATELET # BLD AUTO: 194 10E3/UL (ref 150–450)
RBC # BLD AUTO: 4.05 10E6/UL (ref 3.8–5.2)
SPECIMEN EXPIRATION DATE: NORMAL
T PALLIDUM AB SER QL: NONREACTIVE
WBC # BLD AUTO: 7.2 10E3/UL (ref 4–11)

## 2022-07-01 PROCEDURE — 250N000013 HC RX MED GY IP 250 OP 250 PS 637: Performed by: OBSTETRICS & GYNECOLOGY

## 2022-07-01 PROCEDURE — 370N000017 HC ANESTHESIA TECHNICAL FEE, PER MIN: Performed by: OBSTETRICS & GYNECOLOGY

## 2022-07-01 PROCEDURE — 86780 TREPONEMA PALLIDUM: CPT | Performed by: OBSTETRICS & GYNECOLOGY

## 2022-07-01 PROCEDURE — C9290 INJ, BUPIVACAINE LIPOSOME: HCPCS | Performed by: ANESTHESIOLOGY

## 2022-07-01 PROCEDURE — 250N000011 HC RX IP 250 OP 636: Performed by: OBSTETRICS & GYNECOLOGY

## 2022-07-01 PROCEDURE — 272N000001 HC OR GENERAL SUPPLY STERILE: Performed by: OBSTETRICS & GYNECOLOGY

## 2022-07-01 PROCEDURE — 999N000016 HC STATISTIC ATTENDANCE AT DELIVERY

## 2022-07-01 PROCEDURE — 36415 COLL VENOUS BLD VENIPUNCTURE: CPT | Performed by: OBSTETRICS & GYNECOLOGY

## 2022-07-01 PROCEDURE — 120N000001 HC R&B MED SURG/OB

## 2022-07-01 PROCEDURE — 86901 BLOOD TYPING SEROLOGIC RH(D): CPT | Performed by: OBSTETRICS & GYNECOLOGY

## 2022-07-01 PROCEDURE — 258N000003 HC RX IP 258 OP 636: Performed by: OBSTETRICS & GYNECOLOGY

## 2022-07-01 PROCEDURE — 999N000249 HC STATISTIC C-SECTION ON UNIT

## 2022-07-01 PROCEDURE — 250N000011 HC RX IP 250 OP 636

## 2022-07-01 PROCEDURE — 360N000076 HC SURGERY LEVEL 3, PER MIN: Performed by: OBSTETRICS & GYNECOLOGY

## 2022-07-01 PROCEDURE — 258N000003 HC RX IP 258 OP 636: Performed by: ANESTHESIOLOGY

## 2022-07-01 PROCEDURE — 85004 AUTOMATED DIFF WBC COUNT: CPT | Performed by: OBSTETRICS & GYNECOLOGY

## 2022-07-01 PROCEDURE — 250N000009 HC RX 250: Performed by: ANESTHESIOLOGY

## 2022-07-01 PROCEDURE — P9041 ALBUMIN (HUMAN),5%, 50ML: HCPCS | Performed by: ANESTHESIOLOGY

## 2022-07-01 PROCEDURE — 250N000011 HC RX IP 250 OP 636: Performed by: ANESTHESIOLOGY

## 2022-07-01 RX ORDER — BUPIVACAINE HYDROCHLORIDE 2.5 MG/ML
INJECTION, SOLUTION EPIDURAL; INFILTRATION; INTRACAUDAL
Status: DISCONTINUED | OUTPATIENT
Start: 2022-07-01 | End: 2022-07-01

## 2022-07-01 RX ORDER — NALOXONE HYDROCHLORIDE 0.4 MG/ML
0.4 INJECTION, SOLUTION INTRAMUSCULAR; INTRAVENOUS; SUBCUTANEOUS
Status: DISCONTINUED | OUTPATIENT
Start: 2022-07-01 | End: 2022-07-03 | Stop reason: HOSPADM

## 2022-07-01 RX ORDER — CEFAZOLIN SODIUM/WATER 2 G/20 ML
2 SYRINGE (ML) INTRAVENOUS SEE ADMIN INSTRUCTIONS
Status: DISCONTINUED | OUTPATIENT
Start: 2022-07-01 | End: 2022-07-01

## 2022-07-01 RX ORDER — SODIUM CHLORIDE, SODIUM LACTATE, POTASSIUM CHLORIDE, CALCIUM CHLORIDE 600; 310; 30; 20 MG/100ML; MG/100ML; MG/100ML; MG/100ML
INJECTION, SOLUTION INTRAVENOUS CONTINUOUS
Status: DISCONTINUED | OUTPATIENT
Start: 2022-07-01 | End: 2022-07-01

## 2022-07-01 RX ORDER — OXYTOCIN/0.9 % SODIUM CHLORIDE 30/500 ML
340 PLASTIC BAG, INJECTION (ML) INTRAVENOUS CONTINUOUS PRN
Status: DISCONTINUED | OUTPATIENT
Start: 2022-07-01 | End: 2022-07-03 | Stop reason: HOSPADM

## 2022-07-01 RX ORDER — METHYLERGONOVINE MALEATE 0.2 MG/ML
200 INJECTION INTRAVENOUS
Status: DISCONTINUED | OUTPATIENT
Start: 2022-07-01 | End: 2022-07-01

## 2022-07-01 RX ORDER — DEXTROSE, SODIUM CHLORIDE, SODIUM LACTATE, POTASSIUM CHLORIDE, AND CALCIUM CHLORIDE 5; .6; .31; .03; .02 G/100ML; G/100ML; G/100ML; G/100ML; G/100ML
INJECTION, SOLUTION INTRAVENOUS CONTINUOUS
Status: DISCONTINUED | OUTPATIENT
Start: 2022-07-01 | End: 2022-07-03 | Stop reason: HOSPADM

## 2022-07-01 RX ORDER — ONDANSETRON 4 MG/1
4 TABLET, ORALLY DISINTEGRATING ORAL EVERY 30 MIN PRN
Status: DISCONTINUED | OUTPATIENT
Start: 2022-07-01 | End: 2022-07-03 | Stop reason: HOSPADM

## 2022-07-01 RX ORDER — LIDOCAINE 40 MG/G
CREAM TOPICAL
Status: DISCONTINUED | OUTPATIENT
Start: 2022-07-01 | End: 2022-07-03 | Stop reason: HOSPADM

## 2022-07-01 RX ORDER — HYDROCORTISONE 25 MG/G
CREAM TOPICAL 3 TIMES DAILY PRN
Status: DISCONTINUED | OUTPATIENT
Start: 2022-07-01 | End: 2022-07-03 | Stop reason: HOSPADM

## 2022-07-01 RX ORDER — ONDANSETRON 4 MG/1
4 TABLET, ORALLY DISINTEGRATING ORAL EVERY 6 HOURS PRN
Status: DISCONTINUED | OUTPATIENT
Start: 2022-07-01 | End: 2022-07-03 | Stop reason: HOSPADM

## 2022-07-01 RX ORDER — ACETAMINOPHEN 325 MG/1
975 TABLET ORAL EVERY 6 HOURS
Status: DISCONTINUED | OUTPATIENT
Start: 2022-07-01 | End: 2022-07-03 | Stop reason: HOSPADM

## 2022-07-01 RX ORDER — OXYCODONE HYDROCHLORIDE 5 MG/1
5 TABLET ORAL EVERY 4 HOURS PRN
Status: DISCONTINUED | OUTPATIENT
Start: 2022-07-01 | End: 2022-07-03 | Stop reason: HOSPADM

## 2022-07-01 RX ORDER — OXYTOCIN 10 [USP'U]/ML
10 INJECTION, SOLUTION INTRAMUSCULAR; INTRAVENOUS
Status: DISCONTINUED | OUTPATIENT
Start: 2022-07-01 | End: 2022-07-03 | Stop reason: HOSPADM

## 2022-07-01 RX ORDER — CEFAZOLIN SODIUM/WATER 2 G/20 ML
2 SYRINGE (ML) INTRAVENOUS ONCE
Status: DISCONTINUED | OUTPATIENT
Start: 2022-07-01 | End: 2022-07-03 | Stop reason: HOSPADM

## 2022-07-01 RX ORDER — IBUPROFEN 800 MG/1
800 TABLET, FILM COATED ORAL EVERY 6 HOURS
Status: DISCONTINUED | OUTPATIENT
Start: 2022-07-02 | End: 2022-07-03 | Stop reason: HOSPADM

## 2022-07-01 RX ORDER — SIMETHICONE 80 MG
80 TABLET,CHEWABLE ORAL 4 TIMES DAILY PRN
Status: DISCONTINUED | OUTPATIENT
Start: 2022-07-01 | End: 2022-07-03 | Stop reason: HOSPADM

## 2022-07-01 RX ORDER — CARBOPROST TROMETHAMINE 250 UG/ML
250 INJECTION, SOLUTION INTRAMUSCULAR
Status: DISCONTINUED | OUTPATIENT
Start: 2022-07-01 | End: 2022-07-03 | Stop reason: HOSPADM

## 2022-07-01 RX ORDER — LIDOCAINE 40 MG/G
CREAM TOPICAL
Status: DISCONTINUED | OUTPATIENT
Start: 2022-07-01 | End: 2022-07-01

## 2022-07-01 RX ORDER — MODIFIED LANOLIN
OINTMENT (GRAM) TOPICAL
Status: DISCONTINUED | OUTPATIENT
Start: 2022-07-01 | End: 2022-07-03 | Stop reason: HOSPADM

## 2022-07-01 RX ORDER — MISOPROSTOL 200 UG/1
800 TABLET ORAL
Status: DISCONTINUED | OUTPATIENT
Start: 2022-07-01 | End: 2022-07-03 | Stop reason: HOSPADM

## 2022-07-01 RX ORDER — BISACODYL 10 MG
10 SUPPOSITORY, RECTAL RECTAL DAILY PRN
Status: DISCONTINUED | OUTPATIENT
Start: 2022-07-03 | End: 2022-07-03 | Stop reason: HOSPADM

## 2022-07-01 RX ORDER — OXYTOCIN/0.9 % SODIUM CHLORIDE 30/500 ML
100-340 PLASTIC BAG, INJECTION (ML) INTRAVENOUS CONTINUOUS PRN
Status: DISCONTINUED | OUTPATIENT
Start: 2022-07-01 | End: 2022-07-01

## 2022-07-01 RX ORDER — PROCHLORPERAZINE 25 MG
25 SUPPOSITORY, RECTAL RECTAL EVERY 12 HOURS PRN
Status: DISCONTINUED | OUTPATIENT
Start: 2022-07-01 | End: 2022-07-03 | Stop reason: HOSPADM

## 2022-07-01 RX ORDER — ONDANSETRON 2 MG/ML
4 INJECTION INTRAMUSCULAR; INTRAVENOUS EVERY 30 MIN PRN
Status: DISCONTINUED | OUTPATIENT
Start: 2022-07-01 | End: 2022-07-03 | Stop reason: HOSPADM

## 2022-07-01 RX ORDER — FENTANYL CITRATE 50 UG/ML
25 INJECTION, SOLUTION INTRAMUSCULAR; INTRAVENOUS
Status: DISCONTINUED | OUTPATIENT
Start: 2022-07-01 | End: 2022-07-03 | Stop reason: HOSPADM

## 2022-07-01 RX ORDER — ALBUMIN, HUMAN INJ 5% 5 %
SOLUTION INTRAVENOUS CONTINUOUS PRN
Status: DISCONTINUED | OUTPATIENT
Start: 2022-07-01 | End: 2022-07-01

## 2022-07-01 RX ORDER — ACETAMINOPHEN 325 MG/1
975 TABLET ORAL ONCE
Status: COMPLETED | OUTPATIENT
Start: 2022-07-01 | End: 2022-07-01

## 2022-07-01 RX ORDER — CITRIC ACID/SODIUM CITRATE 334-500MG
30 SOLUTION, ORAL ORAL ONCE
Status: DISCONTINUED | OUTPATIENT
Start: 2022-07-01 | End: 2022-07-03 | Stop reason: HOSPADM

## 2022-07-01 RX ORDER — FENTANYL CITRATE 50 UG/ML
25 INJECTION, SOLUTION INTRAMUSCULAR; INTRAVENOUS EVERY 5 MIN PRN
Status: DISCONTINUED | OUTPATIENT
Start: 2022-07-01 | End: 2022-07-03 | Stop reason: HOSPADM

## 2022-07-01 RX ORDER — CEFAZOLIN SODIUM/WATER 2 G/20 ML
2 SYRINGE (ML) INTRAVENOUS ONCE
Status: DISCONTINUED | OUTPATIENT
Start: 2022-07-01 | End: 2022-07-01 | Stop reason: CLARIF

## 2022-07-01 RX ORDER — MORPHINE SULFATE 1 MG/ML
INJECTION, SOLUTION EPIDURAL; INTRATHECAL; INTRAVENOUS
Status: COMPLETED | OUTPATIENT
Start: 2022-07-01 | End: 2022-07-01

## 2022-07-01 RX ORDER — FENTANYL CITRATE 50 UG/ML
INJECTION, SOLUTION INTRAMUSCULAR; INTRAVENOUS
Status: COMPLETED | OUTPATIENT
Start: 2022-07-01 | End: 2022-07-01

## 2022-07-01 RX ORDER — DEXAMETHASONE SODIUM PHOSPHATE 4 MG/ML
INJECTION, SOLUTION INTRA-ARTICULAR; INTRALESIONAL; INTRAMUSCULAR; INTRAVENOUS; SOFT TISSUE PRN
Status: DISCONTINUED | OUTPATIENT
Start: 2022-07-01 | End: 2022-07-01

## 2022-07-01 RX ORDER — PROCHLORPERAZINE MALEATE 10 MG
10 TABLET ORAL EVERY 6 HOURS PRN
Status: DISCONTINUED | OUTPATIENT
Start: 2022-07-01 | End: 2022-07-03 | Stop reason: HOSPADM

## 2022-07-01 RX ORDER — DIPHENHYDRAMINE HCL 25 MG
25 CAPSULE ORAL EVERY 6 HOURS PRN
Status: DISCONTINUED | OUTPATIENT
Start: 2022-07-01 | End: 2022-07-03 | Stop reason: HOSPADM

## 2022-07-01 RX ORDER — MORPHINE SULFATE 0.5 MG/ML
150 INJECTION, SOLUTION EPIDURAL; INTRATHECAL; INTRAVENOUS ONCE
Status: DISCONTINUED | OUTPATIENT
Start: 2022-07-01 | End: 2022-07-03 | Stop reason: HOSPADM

## 2022-07-01 RX ORDER — NALOXONE HYDROCHLORIDE 0.4 MG/ML
0.2 INJECTION, SOLUTION INTRAMUSCULAR; INTRAVENOUS; SUBCUTANEOUS
Status: DISCONTINUED | OUTPATIENT
Start: 2022-07-01 | End: 2022-07-03 | Stop reason: HOSPADM

## 2022-07-01 RX ORDER — DIPHENHYDRAMINE HYDROCHLORIDE 50 MG/ML
25 INJECTION INTRAMUSCULAR; INTRAVENOUS EVERY 6 HOURS PRN
Status: DISCONTINUED | OUTPATIENT
Start: 2022-07-01 | End: 2022-07-03 | Stop reason: HOSPADM

## 2022-07-01 RX ORDER — MISOPROSTOL 200 UG/1
400 TABLET ORAL
Status: DISCONTINUED | OUTPATIENT
Start: 2022-07-01 | End: 2022-07-03 | Stop reason: HOSPADM

## 2022-07-01 RX ORDER — METHYLERGONOVINE MALEATE 0.2 MG/ML
200 INJECTION INTRAVENOUS
Status: DISCONTINUED | OUTPATIENT
Start: 2022-07-01 | End: 2022-07-03 | Stop reason: HOSPADM

## 2022-07-01 RX ORDER — OXYTOCIN 10 [USP'U]/ML
10 INJECTION, SOLUTION INTRAMUSCULAR; INTRAVENOUS
Status: DISCONTINUED | OUTPATIENT
Start: 2022-07-01 | End: 2022-07-01

## 2022-07-01 RX ORDER — NALBUPHINE HYDROCHLORIDE 10 MG/ML
2.5-5 INJECTION, SOLUTION INTRAMUSCULAR; INTRAVENOUS; SUBCUTANEOUS EVERY 6 HOURS PRN
Status: DISCONTINUED | OUTPATIENT
Start: 2022-07-01 | End: 2022-07-03 | Stop reason: HOSPADM

## 2022-07-01 RX ORDER — CITRIC ACID/SODIUM CITRATE 334-500MG
30 SOLUTION, ORAL ORAL
Status: COMPLETED | OUTPATIENT
Start: 2022-07-01 | End: 2022-07-01

## 2022-07-01 RX ORDER — ONDANSETRON 2 MG/ML
4 INJECTION INTRAMUSCULAR; INTRAVENOUS EVERY 6 HOURS PRN
Status: DISCONTINUED | OUTPATIENT
Start: 2022-07-01 | End: 2022-07-03 | Stop reason: HOSPADM

## 2022-07-01 RX ORDER — OXYTOCIN/0.9 % SODIUM CHLORIDE 30/500 ML
340 PLASTIC BAG, INJECTION (ML) INTRAVENOUS CONTINUOUS PRN
Status: DISCONTINUED | OUTPATIENT
Start: 2022-07-01 | End: 2022-07-01

## 2022-07-01 RX ORDER — HYDROMORPHONE HCL IN WATER/PF 6 MG/30 ML
0.2 PATIENT CONTROLLED ANALGESIA SYRINGE INTRAVENOUS EVERY 5 MIN PRN
Status: DISCONTINUED | OUTPATIENT
Start: 2022-07-01 | End: 2022-07-03 | Stop reason: HOSPADM

## 2022-07-01 RX ORDER — METOCLOPRAMIDE 10 MG/1
10 TABLET ORAL EVERY 6 HOURS PRN
Status: DISCONTINUED | OUTPATIENT
Start: 2022-07-01 | End: 2022-07-03 | Stop reason: HOSPADM

## 2022-07-01 RX ORDER — FENTANYL CITRATE-0.9 % NACL/PF 10 MCG/ML
PLASTIC BAG, INJECTION (ML) INTRAVENOUS CONTINUOUS PRN
Status: DISCONTINUED | OUTPATIENT
Start: 2022-07-01 | End: 2022-07-01

## 2022-07-01 RX ORDER — CEFAZOLIN SODIUM 2 G/100ML
2 INJECTION, SOLUTION INTRAVENOUS
Status: DISCONTINUED | OUTPATIENT
Start: 2022-07-01 | End: 2022-07-01 | Stop reason: ALTCHOICE

## 2022-07-01 RX ORDER — BUPIVACAINE HYDROCHLORIDE 7.5 MG/ML
INJECTION, SOLUTION INTRASPINAL
Status: COMPLETED | OUTPATIENT
Start: 2022-07-01 | End: 2022-07-01

## 2022-07-01 RX ORDER — AMOXICILLIN 250 MG
1 CAPSULE ORAL 2 TIMES DAILY
Status: DISCONTINUED | OUTPATIENT
Start: 2022-07-01 | End: 2022-07-03 | Stop reason: HOSPADM

## 2022-07-01 RX ORDER — MISOPROSTOL 200 UG/1
400 TABLET ORAL
Status: DISCONTINUED | OUTPATIENT
Start: 2022-07-01 | End: 2022-07-01

## 2022-07-01 RX ORDER — AMOXICILLIN 250 MG
2 CAPSULE ORAL 2 TIMES DAILY
Status: DISCONTINUED | OUTPATIENT
Start: 2022-07-01 | End: 2022-07-03 | Stop reason: HOSPADM

## 2022-07-01 RX ORDER — SODIUM CHLORIDE, SODIUM LACTATE, POTASSIUM CHLORIDE, CALCIUM CHLORIDE 600; 310; 30; 20 MG/100ML; MG/100ML; MG/100ML; MG/100ML
INJECTION, SOLUTION INTRAVENOUS CONTINUOUS
Status: DISCONTINUED | OUTPATIENT
Start: 2022-07-01 | End: 2022-07-03 | Stop reason: HOSPADM

## 2022-07-01 RX ORDER — MISOPROSTOL 200 UG/1
800 TABLET ORAL
Status: DISCONTINUED | OUTPATIENT
Start: 2022-07-01 | End: 2022-07-01

## 2022-07-01 RX ORDER — METOCLOPRAMIDE HYDROCHLORIDE 5 MG/ML
10 INJECTION INTRAMUSCULAR; INTRAVENOUS EVERY 6 HOURS PRN
Status: DISCONTINUED | OUTPATIENT
Start: 2022-07-01 | End: 2022-07-03 | Stop reason: HOSPADM

## 2022-07-01 RX ORDER — ONDANSETRON 2 MG/ML
INJECTION INTRAMUSCULAR; INTRAVENOUS PRN
Status: DISCONTINUED | OUTPATIENT
Start: 2022-07-01 | End: 2022-07-01

## 2022-07-01 RX ORDER — OXYTOCIN/0.9 % SODIUM CHLORIDE 30/500 ML
PLASTIC BAG, INJECTION (ML) INTRAVENOUS CONTINUOUS PRN
Status: DISCONTINUED | OUTPATIENT
Start: 2022-07-01 | End: 2022-07-01

## 2022-07-01 RX ORDER — KETOROLAC TROMETHAMINE 30 MG/ML
30 INJECTION, SOLUTION INTRAMUSCULAR; INTRAVENOUS EVERY 6 HOURS
Status: COMPLETED | OUTPATIENT
Start: 2022-07-01 | End: 2022-07-02

## 2022-07-01 RX ORDER — CARBOPROST TROMETHAMINE 250 UG/ML
250 INJECTION, SOLUTION INTRAMUSCULAR
Status: DISCONTINUED | OUTPATIENT
Start: 2022-07-01 | End: 2022-07-01

## 2022-07-01 RX ADMIN — SODIUM CHLORIDE, POTASSIUM CHLORIDE, SODIUM LACTATE AND CALCIUM CHLORIDE: 600; 310; 30; 20 INJECTION, SOLUTION INTRAVENOUS at 08:05

## 2022-07-01 RX ADMIN — PHENYLEPHRINE HYDROCHLORIDE 300 MCG: 10 INJECTION INTRAVENOUS at 10:49

## 2022-07-01 RX ADMIN — ONDANSETRON 4 MG: 2 INJECTION INTRAMUSCULAR; INTRAVENOUS at 12:33

## 2022-07-01 RX ADMIN — Medication 50 MCG/MIN: at 09:47

## 2022-07-01 RX ADMIN — SODIUM CHLORIDE, POTASSIUM CHLORIDE, SODIUM LACTATE AND CALCIUM CHLORIDE: 600; 310; 30; 20 INJECTION, SOLUTION INTRAVENOUS at 10:31

## 2022-07-01 RX ADMIN — Medication 0.15 MG: at 09:46

## 2022-07-01 RX ADMIN — ACETAMINOPHEN 975 MG: 325 TABLET ORAL at 08:52

## 2022-07-01 RX ADMIN — FENTANYL CITRATE 15 MCG: 50 INJECTION, SOLUTION INTRAMUSCULAR; INTRAVENOUS at 09:46

## 2022-07-01 RX ADMIN — PHENYLEPHRINE HYDROCHLORIDE 200 MCG: 10 INJECTION INTRAVENOUS at 10:38

## 2022-07-01 RX ADMIN — PHENYLEPHRINE HYDROCHLORIDE 100 MCG: 10 INJECTION INTRAVENOUS at 10:31

## 2022-07-01 RX ADMIN — SODIUM CHLORIDE, SODIUM LACTATE, POTASSIUM CHLORIDE, CALCIUM CHLORIDE AND DEXTROSE MONOHYDRATE: 5; 600; 310; 30; 20 INJECTION, SOLUTION INTRAVENOUS at 13:58

## 2022-07-01 RX ADMIN — SENNOSIDES AND DOCUSATE SODIUM 2 TABLET: 50; 8.6 TABLET ORAL at 21:15

## 2022-07-01 RX ADMIN — SODIUM CITRATE AND CITRIC ACID MONOHYDRATE 30 ML: 500; 334 SOLUTION ORAL at 08:52

## 2022-07-01 RX ADMIN — ONDANSETRON 4 MG: 2 INJECTION INTRAMUSCULAR; INTRAVENOUS at 09:47

## 2022-07-01 RX ADMIN — BUPIVACAINE HYDROCHLORIDE 20 ML: 2.5 INJECTION, SOLUTION EPIDURAL; INFILTRATION; INTRACAUDAL at 10:51

## 2022-07-01 RX ADMIN — DEXAMETHASONE SODIUM PHOSPHATE 4 MG: 4 INJECTION, SOLUTION INTRA-ARTICULAR; INTRALESIONAL; INTRAMUSCULAR; INTRAVENOUS; SOFT TISSUE at 09:47

## 2022-07-01 RX ADMIN — BUPIVACAINE 20 ML: 13.3 INJECTION, SUSPENSION, LIPOSOMAL INFILTRATION at 10:51

## 2022-07-01 RX ADMIN — Medication 600 ML/HR: at 10:10

## 2022-07-01 RX ADMIN — PHENYLEPHRINE HYDROCHLORIDE 100 MCG: 10 INJECTION INTRAVENOUS at 09:47

## 2022-07-01 RX ADMIN — ACETAMINOPHEN 975 MG: 325 TABLET ORAL at 15:23

## 2022-07-01 RX ADMIN — PHENYLEPHRINE HYDROCHLORIDE 100 MCG: 10 INJECTION INTRAVENOUS at 10:33

## 2022-07-01 RX ADMIN — SODIUM CHLORIDE, POTASSIUM CHLORIDE, SODIUM LACTATE AND CALCIUM CHLORIDE 1000 ML: 600; 310; 30; 20 INJECTION, SOLUTION INTRAVENOUS at 09:07

## 2022-07-01 RX ADMIN — KETOROLAC TROMETHAMINE 30 MG: 30 INJECTION, SOLUTION INTRAMUSCULAR; INTRAVENOUS at 15:21

## 2022-07-01 RX ADMIN — ACETAMINOPHEN 975 MG: 325 TABLET ORAL at 21:15

## 2022-07-01 RX ADMIN — PROCHLORPERAZINE EDISYLATE 10 MG: 5 INJECTION INTRAMUSCULAR; INTRAVENOUS at 16:45

## 2022-07-01 RX ADMIN — PHENYLEPHRINE HYDROCHLORIDE 300 MCG: 10 INJECTION INTRAVENOUS at 10:42

## 2022-07-01 RX ADMIN — Medication 2 G: at 09:37

## 2022-07-01 RX ADMIN — KETOROLAC TROMETHAMINE 30 MG: 30 INJECTION, SOLUTION INTRAMUSCULAR; INTRAVENOUS at 21:15

## 2022-07-01 RX ADMIN — ALBUMIN (HUMAN): 12.5 SOLUTION INTRAVENOUS at 10:48

## 2022-07-01 RX ADMIN — BUPIVACAINE HYDROCHLORIDE IN DEXTROSE 1.6 ML: 7.5 INJECTION, SOLUTION SUBARACHNOID at 09:46

## 2022-07-01 ASSESSMENT — ACTIVITIES OF DAILY LIVING (ADL)
WEAR_GLASSES_OR_BLIND: NO
WALKING_OR_CLIMBING_STAIRS_DIFFICULTY: NO
ADLS_ACUITY_SCORE: 18
ADLS_ACUITY_SCORE: 18
DOING_ERRANDS_INDEPENDENTLY_DIFFICULTY: NO
FALL_HISTORY_WITHIN_LAST_SIX_MONTHS: NO
DRESSING/BATHING_DIFFICULTY: NO
DIFFICULTY_COMMUNICATING: NO
ADLS_ACUITY_SCORE: 18
ADLS_ACUITY_SCORE: 18
DIFFICULTY_EATING/SWALLOWING: NO
TOILETING_ISSUES: NO
ADLS_ACUITY_SCORE: 18
CONCENTRATING,_REMEMBERING_OR_MAKING_DECISIONS_DIFFICULTY: NO
ADLS_ACUITY_SCORE: 18
CHANGE_IN_FUNCTIONAL_STATUS_SINCE_ONSET_OF_CURRENT_ILLNESS/INJURY: NO
ADLS_ACUITY_SCORE: 18
ADLS_ACUITY_SCORE: 18

## 2022-07-01 NOTE — ANESTHESIA CARE TRANSFER NOTE
Patient: Felicia Mcallister    Procedure: Procedure(s):  REPEAT  SECTION       Diagnosis: Previous  delivery, antepartum [O34.219]  Diagnosis Additional Information: No value filed.    Anesthesia Type:   Spinal     Note:    Oropharynx: oropharynx clear of all foreign objects  Level of Consciousness: awake  Oxygen Supplementation: room air    Independent Airway: airway patency satisfactory and stable  Dentition: dentition unchanged  Vital Signs Stable: post-procedure vital signs reviewed and stable  Report to RN Given: handoff report given  Patient transferred to: PACU    Handoff Report: Identifed the Patient, Identified the Reponsible Provider, Reviewed the pertinent medical history, Discussed the surgical course, Reviewed Intra-OP anesthesia mangement and issues during anesthesia, Set expectations for post-procedure period and Allowed opportunity for questions and acknowledgement of understanding      Vitals:  Vitals Value Taken Time   BP     Temp     Pulse     Resp     SpO2         Electronically Signed By: ERICA Guillen CRNA  2022  11:10 AM

## 2022-07-01 NOTE — ANESTHESIA PREPROCEDURE EVALUATION
Anesthesia Pre-Procedure Evaluation    Patient: Felicia Mcallister   MRN: [id number] : 1985        Procedure : Procedure(s):  REPEAT  SECTION          Past Medical History:   Diagnosis Date     Depressive disorder       Past Surgical History:   Procedure Laterality Date      SECTION N/A 3/20/2020    Procedure:  SECTION;  Surgeon: Jens To MD;  Location: [facility] OR;  Service: Obstetrics      No Known Allergies   Social History     Tobacco Use     Smoking status: Never Smoker     Smokeless tobacco: Never Used   Substance Use Topics     Alcohol use: Not Currently      Wt Readings from Last 1 Encounters:   22 77.1 kg (170 lb)        Anesthesia Evaluation   Pt has had prior anesthetic. Type: Regional.    No history of anesthetic complications       ROS/MED HX  ENT/Pulmonary:  - neg pulmonary ROS     Neurologic:  - neg neurologic ROS     Cardiovascular:  - neg cardiovascular ROS     METS/Exercise Tolerance:     Hematologic:  - neg hematologic  ROS     Musculoskeletal:       GI/Hepatic:  - neg GI/hepatic ROS     Renal/Genitourinary:       Endo:  - neg endo ROS     Psychiatric/Substance Use:  - neg psychiatric ROS     Infectious Disease:       Malignancy:       Other:   pregnant         Physical Exam    Airway        Mallampati: II       Respiratory Devices and Support         Dental  no notable dental history         Cardiovascular   cardiovascular exam normal          Pulmonary   pulmonary exam normal                OUTSIDE LABS:  CBC:   Lab Results   Component Value Date    WBC 7.2 2022    WBC 16.4 (H) 2020    HGB 12.3 2022    HGB 9.7 (L) 2020    HCT 37.8 2022    HCT 36.3 2020     2022     2020     BMP:   Lab Results   Component Value Date    CR 0.54 (L) 2020     COAGS: No results found for: PTT, INR, FIBR  POC: No results found for: BGM, HCG, HCGS  HEPATIC:   Lab Results   Component Value Date     ALT 15 02/06/2020    AST 21 02/06/2020     OTHER:   Lab Results   Component Value Date    A1C 5.5 10/16/2020       Anesthesia Plan    ASA Status:  2      Anesthesia Type: Spinal.              Consents    Anesthesia Plan(s) and associated risks, benefits, and realistic alternatives discussed. Questions answered and patient/representative(s) expressed understanding.    - Discussed:     - Discussed with:  Patient, Spouse         Postoperative Care            Comments:    Other Comments: Plan spinal with duramorph.  TAP blocks for post- op pain            Sandi Mohr MD

## 2022-07-01 NOTE — ANESTHESIA PROCEDURE NOTES
Intrathecal injection Procedure Note    Pre-Procedure   Staff -        Performed By: anesthesiologist       Location: OR       Procedure Start/Stop Times: 7/1/2022 9:46 AM and 7/1/2022 9:48 AM       Pre-Anesthestic Checklist: patient identified, IV checked, risks and benefits discussed, informed consent, monitors and equipment checked, pre-op evaluation, at physician/surgeon's request and post-op pain management  Timeout:       Correct Patient: Yes        Correct Procedure: Yes        Correct Site: Yes        Correct Position: Yes   Procedure Documentation  Procedure: intrathecal injection       Patient Position: sitting       Skin prep: Chloraprep       Insertion Site: L3-4. (midline approach).       Needle Gauge: 24.        Needle Length (Inches): 3.5        Spinal Needle Type: Pencan       Introducer used       # of attempts: 1 and  # of redirects:     Assessment/Narrative         Paresthesias: No.       CSF fluid: clear.    Medication(s) Administered   0.75% Hyperbaric Bupivacaine (Intrathecal) - Intrathecal   1.6 mL - 7/1/2022 9:46:00 AM  Morphine PF 1 mg/mL (Intrathecal) - Intrathecal   0.15 mg - 7/1/2022 9:46:00 AM  Fentanyl PF (Intrathecal) - Intrathecal   15 mcg - 7/1/2022 9:46:00 AM  Medication Administration Time: 7/1/2022 9:46 AM

## 2022-07-01 NOTE — H&P
[facility] Labor and Delivery History and Physical    Felicia Mcallister MRN# [id number]   Age: 36 year old YOB: 1985     Date of Admission: 2022    Primary care provider: Monika Ref-Primary, Physician    Chief Complaint:   Felicia Mcallister is a 36 year old female who is 39w0d pregnant and being admitted for planned repeat c/s.    History of present illness:   Patient presents for scheduled repeat c/s.    Pregnancy Complications/Problems:  Previous c/s  GBS negative  GDM diet controlled  AMA     Pregnancy history:     OBSTETRIC HISTORY:    OB History    Para Term  AB Living   2 1 1 0 0 1   SAB IAB Ectopic Multiple Live Births   0 0 0 0 1      # Outcome Date GA Lbr Jeremy/2nd Weight Sex Delivery Anes PTL Lv   2 Current            1 Term 20 41w2d  4.26 kg (9 lb 6.3 oz) M CS-LTranv Spinal, EPI N MEG      Complications: Dysfunctional Labor, Failure to Progress in First Stage      Name: LEANNE MCALLISTER      Apgar1: 8  Apgar5: 9     EDC: Estimated Date of Delivery: 2022    GBS Status: No results found for: GBS    Medication Prior to Admission  Medications Prior to Admission   Medication Sig Dispense Refill Last Dose     cholecalciferol (VITAMIN D3) 25 mcg (1000 units) capsule Take 2 capsules by mouth daily   2022 at Unknown time     ibuprofen (ADVIL,MOTRIN) 600 MG tablet [IBUPROFEN (ADVIL,MOTRIN) 600 MG TABLET] Take 1 tablet (600 mg total) by mouth every 6 (six) hours as needed for pain. 60 tablet 2 More than a month at Unknown time     multivit-min/iron/folic/vux711 (HAIR, SKIN AND NAILS ADVANCED ORAL) [MULTIVIT-MIN/IRON/FOLIC/RHI918 (HAIR, SKIN AND NAILS ADVANCED ORAL)] Take by mouth.   More than a month at Unknown time     Prenatal Vit-Fe Fumarate-FA (PRENATAL MULTIVITAMIN W/IRON) 27-0.8 MG tablet Take 1 tablet by mouth daily   2022 at Unknown time     acetaminophen (TYLENOL) 500 MG tablet [ACETAMINOPHEN (TYLENOL) 500 MG TABLET] Take 2  tablets (1,000 mg total) by mouth every 6 (six) hours as needed for pain. 60 tablet 2        Maternal Past Medical History:     Past Medical History:   Diagnosis Date     Depressive disorder                   Family History:   Non-contributory     Social History:   I have reviewed this patient's social history and commented on significant items within the HPI    Past Surgical History:    Previous c/s    Physical Exam:   Vitals were reviewed  Temp: 98.4  F (36.9  C) Temp src: Oral BP: 119/67 Pulse: 77   Resp: 16        Constitutional:   awake, alert, cooperative, no apparent distress, and appears stated age     Cardiovascular:   S1/S2     Chest / Breast:   CTAB         Skin:   normal skin color, texture, turgor      Cervix:Deferred  Presentation:Vertex  Fetal Heart Rate Tracing: reactive and reassuring  Tocometer: external monitor and infrequent                   Assessment:    @ 39 weeks  Previous c/s, scheduled repeat  GBS negative  GDM diet controlled  COVID-19 negative        Plan:   H&P reviewed - faxed and sent from office.  No interval changes in history.    INDICATION: Previous c/s, desires repeat    PLANNED PROCEDURE: Repeat     Risks, benefits and alternatives reviewed.  Postoperative course and expectations reviewed.  Verbal and written consent obtained - will proceed with planned surgery.    Jens To MD

## 2022-07-01 NOTE — OP NOTE
[facility] Operative Note    Patient Name: Felicia Mcallister   Patient :  1985  Patient MRN:  [id number]    Procedure date: 2022    Pre-operative diagnosis: Previous  delivery, antepartum [O34.219]   Gestational diabetes, diet controlled  Malpresentation  GBS negative  COVID-19 negative  Suspected LGA  AMA  BMI 33     Post-operative diagnosis: Same     Procedure: Repeat low transverse  section via Pfannenstiel       Surgeon: Jens To MD     Assistants(s): Ale Contreras MD     Anesthesia: Spinal with Tap Block, Duramorph      Estimated blood loss: Delivery QBL (mL): 698 ml      Specimens:    Indications: * No specimens in log *     Patient presents for scheduled repeat  delivery.     Findings: Male  in double footling breech presentation.  APGAR scores pending.  Weight 9 pounds, 13 ounces. Normal appearing uterus, tubes and ovaries bilaterally.      Complications: Difficult fetal extraction requiring extension of incisions.     Condition: Stable       Description of procedure:  Risks, benefits and alternatives were reviewed, including the risks of bleeding, infection and injury to pelvic structures including bladder, bowel and ureters.  After both verbal and written consent were obtained, the patient was taken to [facility] L&D Operating Room where anesthesia was administered without difficulty.  Cha catheter was placed and preoperative antibiotics were administered.  The patient was prepped and draped in the usual sterile fashion in the dorsal supine position with a leftward tilt.    After testing for appropriate anesthesia, a Pfannenstiel skin incision was made with scalpel at her previous incision site and carried down to the underlying layer of fascia with both sharp and Bovie cautery.  The fascia was nicked in the midline and this incision was extended laterally with Levy scissors.  The fascia was dissected off the underlying  rectus muscles with both sharp and blunt dissection.  The rectus muscle was  bluntly in the midline and the peritoneum was entered and  bluntly as well.  Bladder blade was inserted and the vesicouterine peritoneum was identified, grasped with pickup, and entered sharply with Metzenbaum scissors.  This incision was extended laterally and a bladder flap created digitally.    The bladder blade was reinserted and the lower uterine segment was incised in a transverse fashion with scalpel and the incision was extended bluntly.  Amniotomy was performed with clear fluid noted.  The  feet were brought to the incision and the body was delivered in standard breech maneuvers.  There was difficulty with delivery of fetal head - the skin incision was extended on the patient's right, rectus muscles were incised on the maternal left to create more space.  With continued pressure to flex the head, the head was finally delivered and a limp baby was handed off to [facility] staff.  The cord clamped x 2 and cut.   was handed off to waiting OR staff.    The placenta was delivered manually and the uterus was exteriorized and cleared of all clot and debris.  The lower uterine segment was repaired in a running locked fashion with 0-Monocryl.  A second layer was performed in an imbricating manner using 0-Monocryl.  The uterus was returned to the abdomen.  The hysterotomy site was examined and found to be hemostatic after figure of 8 suture of 0-Monocryl was placed at the midline incision. The pelvis was copiously irrigated with warmed normal saline.  The peritoneum was not closed.    The rectus muscles were bluntly reapproximated in the midline.  Bleeding at the rectus from needing to extended the incision was made hemostatic with Bovie cautery.  The fascia was closed with 0-loop PDS in a running fashion.  The subcutaneous tissues were examined and any bleeding was gently coagulated with Bovie cautery.  The  subcuteaneous tissue was reapproximated using 3-0 Plain.  The skin was closed with 4-0 Monocryl in a running fashion.  Steri-strips and bandages were applied.     The patient tolerated the procedure well.  All sponge, lap, and needle counts were correct x 2 by OR staff.  The patient was transferred to her Recovery Room in stable condition.    Approximately 20 additional minutes were spent in the case due to maternal body habitus and difficult extraction of  requiring extension of incisions.    Specimens: None.    Jens To MD

## 2022-07-01 NOTE — ANESTHESIA POSTPROCEDURE EVALUATION
Patient: Felicia Mcallister    Procedure: Procedure(s):  REPEAT  SECTION       Anesthesia Type:  Spinal    Note:  Disposition: Inpatient   Postop Pain Control: Uneventful            Sign Out: Well controlled pain   PONV: Yes            Sign Out: PONV/POV resolved with treatment   Neuro/Psych: Uneventful            Sign Out: Acceptable/Baseline neuro status   Airway/Respiratory: Uneventful            Sign Out: Acceptable/Baseline resp. status   CV/Hemodynamics: Uneventful            Sign Out: Acceptable CV status; No obvious hypovolemia; No obvious fluid overload   Other NRE: NONE   DID A NON-ROUTINE EVENT OCCUR? No           Last vitals:  Vitals:    22 1243 22 1258 22 1313   BP: 106/57 111/57 110/60   Pulse: 75 67 64   Resp: 16 16 16   Temp:      SpO2: 98% 97% 96%       Electronically Signed By: Sandi Mohr MD  2022  1:44 PM

## 2022-07-01 NOTE — ANESTHESIA PROCEDURE NOTES
TAP Procedure Note    Pre-Procedure   Staff -        Anesthesiologist:  Sandi Mohr MD       Performed By: anesthesiologist       Location: OR       Procedure Start/Stop Times: 7/1/2022 10:51 AM and 7/1/2022 10:54 AM       Pre-Anesthestic Checklist: patient identified, IV checked, site marked, risks and benefits discussed, informed consent, monitors and equipment checked, pre-op evaluation, at physician/surgeon's request and post-op pain management  Timeout:       Correct Patient: Yes        Correct Procedure: Yes        Correct Site: Yes        Correct Position: Yes        Correct Laterality: Yes        Site Marked: Yes  Procedure Documentation  Procedure: TAP       Laterality: bilateral       Patient Position: supine       Patient Prep/Sterile Barriers: sterile gloves, mask       Skin prep: Chloraprep       Needle Type: other       Needle Gauge: 20.        Needle Length (Inches): 4        Ultrasound guided       1. Ultrasound was used to identify targeted nerve, plexus, vascular marker, or fascial plane and place a needle adjacent to it in real-time.       2. Ultrasound was used to visualize the spread of anesthetic in close proximity to the above referenced structure.       3. A permanent image is entered into the patient's record.       4. The visualized anatomic structures appeared normal.       5. There were no apparent abnormal pathologic findings.    Assessment/Narrative         The placement was negative for: blood aspirated and site bleeding       Bolus given via needle..        Secured via.        Insertion/Infusion Method: Single Shot       Complications: none    Medication(s) Administered   Bupivacaine 0.25% PF (Infiltration) - Infiltration   20 mL - 7/1/2022 10:51:00 AM  Bupivacaine liposome (Exparel) 1.3% LA inj susp (Infiltration) - Infiltration   20 mL - 7/1/2022 10:51:00 AM  Medication Administration Time: 7/1/2022 10:51 AM     Comments:  10 mL of each medication on each side

## 2022-07-02 LAB — HGB BLD-MCNC: 9 G/DL (ref 11.7–15.7)

## 2022-07-02 PROCEDURE — 250N000013 HC RX MED GY IP 250 OP 250 PS 637: Performed by: OBSTETRICS & GYNECOLOGY

## 2022-07-02 PROCEDURE — 36415 COLL VENOUS BLD VENIPUNCTURE: CPT | Performed by: OBSTETRICS & GYNECOLOGY

## 2022-07-02 PROCEDURE — 85018 HEMOGLOBIN: CPT | Performed by: OBSTETRICS & GYNECOLOGY

## 2022-07-02 PROCEDURE — 120N000001 HC R&B MED SURG/OB

## 2022-07-02 PROCEDURE — 250N000013 HC RX MED GY IP 250 OP 250 PS 637: Performed by: ANESTHESIOLOGY

## 2022-07-02 PROCEDURE — 250N000011 HC RX IP 250 OP 636: Performed by: OBSTETRICS & GYNECOLOGY

## 2022-07-02 RX ADMIN — ACETAMINOPHEN 975 MG: 325 TABLET ORAL at 03:01

## 2022-07-02 RX ADMIN — IBUPROFEN 800 MG: 800 TABLET ORAL at 09:17

## 2022-07-02 RX ADMIN — KETOROLAC TROMETHAMINE 30 MG: 30 INJECTION, SOLUTION INTRAMUSCULAR; INTRAVENOUS at 03:01

## 2022-07-02 RX ADMIN — ACETAMINOPHEN 975 MG: 325 TABLET ORAL at 20:47

## 2022-07-02 RX ADMIN — IBUPROFEN 800 MG: 800 TABLET ORAL at 20:48

## 2022-07-02 RX ADMIN — ACETAMINOPHEN 975 MG: 325 TABLET ORAL at 09:17

## 2022-07-02 RX ADMIN — IBUPROFEN 800 MG: 800 TABLET ORAL at 14:43

## 2022-07-02 RX ADMIN — SENNOSIDES AND DOCUSATE SODIUM 2 TABLET: 50; 8.6 TABLET ORAL at 20:47

## 2022-07-02 RX ADMIN — OXYCODONE HYDROCHLORIDE 5 MG: 5 TABLET ORAL at 23:14

## 2022-07-02 RX ADMIN — SENNOSIDES AND DOCUSATE SODIUM 1 TABLET: 50; 8.6 TABLET ORAL at 09:17

## 2022-07-02 RX ADMIN — ACETAMINOPHEN 975 MG: 325 TABLET ORAL at 14:43

## 2022-07-02 ASSESSMENT — ACTIVITIES OF DAILY LIVING (ADL)
ADLS_ACUITY_SCORE: 18

## 2022-07-02 NOTE — PROGRESS NOTES
Postpartum Day 1:     Subjective:  The patient feels well. The patient has no emotional concerns. Her pain is well controlled with current medications. She is ambulating well. She is voiding and passing gas. The amount and color of the lochia is appropriate for the duration of recovery, patient denies clots. The infant is well.    Objective   The patient has a blood pressure which is within the normal range. Urinary output is adequate.     Exam:   /55 (BP Location: Right arm, Patient Position: Semi-Lopez's, Cuff Size: Adult Regular)   Pulse 71   Temp 98.5  F (36.9  C) (Oral)   Resp 18   Ht 1.524 m (5')   Wt 77.1 kg (170 lb)   LMP 10/04/2021 (Approximate)   SpO2 98%   Breastfeeding No   BMI 33.20 kg/m    General: NAD, alert and orientated   Abdomen: soft, NT   Fundus: firm, nontender  Incision: covered, C/D/I, ols staining  Ext: no pain     LAB:  Lab Results   Component Value Date    HGB 9.0 (L) 2022       I/Os    Intake/Output Summary (Last 24 hours) at 2022 0927  Last data filed at 2022 2205  Gross per 24 hour   Intake 1670 ml   Output 1662 ml   Net 8 ml         Impression:   Normal postpartum course, POD#1 LTCS     Plan:   - DC catheter  - switch to oral pain meds  - encourage ambulation   - Continue current care.      Ale Contreras MD  2022 9:27 AM

## 2022-07-02 NOTE — PLAN OF CARE
Problem: Plan of Care - These are the overarching goals to be used throughout the patient stay.    Goal: Optimal Comfort and Wellbeing  Outcome: Ongoing, Progressing  Intervention: Monitor Pain and Promote Comfort  Recent Flowsheet Documentation  Taken 7/2/2022 0100 by Giselle Wilhelm, RN  Pain Management Interventions: cold applied  Intervention: Provide Person-Centered Care  Recent Flowsheet Documentation  Taken 7/2/2022 0100 by Giselle Wilhelm, RN  Trust Relationship/Rapport:    care explained    emotional support provided    empathic listening provided     Pt VSS and assessments WNL. Pt able to tolerate PO intake. Pt is managing pain w/ Tylenol and Ibuprofen and ice pack. Incision site is WNL. Some dried drainage on mepilex; has been marked and has not changed. Pt is up ad shawn in room and has voided. Spouse at bedside. Pt is involved w/ [facility] cares.

## 2022-07-02 NOTE — PLAN OF CARE
Problem: Plan of Care - These are the overarching goals to be used throughout the patient stay.    Goal: Optimal Comfort and Wellbeing  Outcome: Ongoing, Progressing    Pain managed with scheduled Tylenol and Ibuprofen. Drainage on mepilex dressing-no change. Bonding well with baby.     Chen Garcia RN

## 2022-07-02 NOTE — PLAN OF CARE
VSS. Incision clean, dry and intact. Complained of nausea and had 2 large emesis. Compazine given, provided relief. Sea band and jamel essential oil also provided. Unable to void post ratliff removal. Straight cath once for 250 ml. Patient able to void now spontaneously. Ambulated in hernandez. Pain adequately controlled with current pain regimen. Fundus firm. Lochia normal.   Problem: Pain (Postpartum  Delivery)  Goal: Acceptable Pain Control  Outcome: Ongoing, Progressing     Problem: Postoperative Urinary Retention (Postpartum  Delivery)  Goal: Effective Urinary Elimination  Outcome: Ongoing, Progressing     Problem: Plan of Care - These are the overarching goals to be used throughout the patient stay.    Goal: Plan of Care Review/Shift Note  Description: The Plan of Care Review/Shift note should be completed every shift.  The Outcome Evaluation is a brief statement about your assessment that the patient is improving, declining, or no change.  This information will be displayed automatically on your shift note.  Outcome: Ongoing, Progressing

## 2022-07-03 VITALS
HEIGHT: 60 IN | RESPIRATION RATE: 16 BRPM | HEART RATE: 73 BPM | OXYGEN SATURATION: 99 % | SYSTOLIC BLOOD PRESSURE: 115 MMHG | WEIGHT: 170 LBS | BODY MASS INDEX: 33.38 KG/M2 | TEMPERATURE: 98.3 F | DIASTOLIC BLOOD PRESSURE: 55 MMHG

## 2022-07-03 PROCEDURE — 250N000013 HC RX MED GY IP 250 OP 250 PS 637: Performed by: OBSTETRICS & GYNECOLOGY

## 2022-07-03 PROCEDURE — 250N000013 HC RX MED GY IP 250 OP 250 PS 637: Performed by: ANESTHESIOLOGY

## 2022-07-03 RX ORDER — AMOXICILLIN 250 MG
2 CAPSULE ORAL 2 TIMES DAILY PRN
COMMUNITY
Start: 2022-07-03 | End: 2023-05-18

## 2022-07-03 RX ORDER — OXYCODONE HYDROCHLORIDE 5 MG/1
5 TABLET ORAL EVERY 4 HOURS PRN
Qty: 15 TABLET | Refills: 0 | Status: SHIPPED | OUTPATIENT
Start: 2022-07-03 | End: 2023-05-18

## 2022-07-03 RX ORDER — IBUPROFEN 800 MG/1
800 TABLET, FILM COATED ORAL EVERY 6 HOURS
Qty: 25 TABLET | Refills: 0 | Status: SHIPPED | OUTPATIENT
Start: 2022-07-03 | End: 2023-05-18

## 2022-07-03 RX ORDER — ACETAMINOPHEN 325 MG/1
975 TABLET ORAL EVERY 6 HOURS
COMMUNITY
Start: 2022-07-03 | End: 2023-05-18

## 2022-07-03 RX ADMIN — ACETAMINOPHEN 975 MG: 325 TABLET ORAL at 04:29

## 2022-07-03 RX ADMIN — IBUPROFEN 800 MG: 800 TABLET ORAL at 04:30

## 2022-07-03 RX ADMIN — SENNOSIDES AND DOCUSATE SODIUM 2 TABLET: 50; 8.6 TABLET ORAL at 10:12

## 2022-07-03 RX ADMIN — OXYCODONE HYDROCHLORIDE 5 MG: 5 TABLET ORAL at 11:29

## 2022-07-03 RX ADMIN — ACETAMINOPHEN 975 MG: 325 TABLET ORAL at 10:13

## 2022-07-03 RX ADMIN — IBUPROFEN 800 MG: 800 TABLET ORAL at 10:13

## 2022-07-03 ASSESSMENT — ACTIVITIES OF DAILY LIVING (ADL)
ADLS_ACUITY_SCORE: 18

## 2022-07-03 NOTE — DISCHARGE SUMMARY
HOSPITAL DISCHARGE SUMMARY -  Birth    Patient Name: Felicia Mcallister   YOB: 1985  Age: 36 year old  Medical Record Number: [id number]  Admission Date: 2022  Discharge Date: 7/3/2022    Felicia Mcallister will be discharged from  [facility] to home. She feels well and has no emotional concerns. Pain is well controlled with current medications. The baby is doing well.     /55 (BP Location: Right arm)   Pulse 73   Temp 98.3  F (36.8  C) (Oral)   Resp 16   Ht 1.524 m (5')   Wt 77.1 kg (170 lb)   LMP 10/04/2021 (Approximate)   SpO2 99%   Breastfeeding No   BMI 33.20 kg/m      REASON FOR ADMISSION: Labor and Delivery    MEDS: see  Med rec    PROCEDURES:  Lower transverse     HISTORY OF PRESENT ILLNESS AND HOSPITAL COURSE: This is a 36 year old female who underwent  section without complication. Postoperative course was unremarkable. She was instructed to avoid any heavy lifting and to call if she had any difficulties.     DISCHARGE INSTRUCTION: Discharge to home  with instructions to follow up in the office in 2 weeks.     Judith Alcaraz MD

## 2022-07-03 NOTE — PLAN OF CARE
"VS stable. Pain is well managed with IB/tylenol and Oxy as well as ice and a belly band. Mepliex dressing intact, drainage outlined, no change. Fundus firm, bleeding scant. Pt is pumping breasts, no visible drops of milk yet, education and a request on lactation board made per pt request. Pt indicates she struggled with milk supply with first baby. Pt plans for discharge tomorrow with infant as long as bili results have improved. YANI Sanchez RN    Problem: Plan of Care - These are the overarching goals to be used throughout the patient stay.    Goal: Plan of Care Review/Shift Note  Description: The Plan of Care Review/Shift note should be completed every shift.  The Outcome Evaluation is a brief statement about your assessment that the patient is improving, declining, or no change.  This information will be displayed automatically on your shift note.  Outcome: Ongoing, Progressing  Goal: Patient-Specific Goal (Individualized)  Description: You can add care plan individualizations to a care plan. Examples of Individualization might be:  \"Parent requests to be called daily at 9am for status\", \"I have a hard time hearing out of my right ear\", or \"Do not touch me to wake me up as it startles me\".  Outcome: Ongoing, Progressing  Goal: Absence of Hospital-Acquired Illness or Injury  Outcome: Ongoing, Progressing  Intervention: Prevent and Manage VTE (Venous Thromboembolism) Risk  Recent Flowsheet Documentation  Taken 7/3/2022 0100 by Xiomy Sanchez RN  VTE Prevention/Management: (pt refused) compression stockings off  Activity Management: ambulated in room  Goal: Optimal Comfort and Wellbeing  Outcome: Ongoing, Progressing  Intervention: Monitor Pain and Promote Comfort  Recent Flowsheet Documentation  Taken 7/2/2022 2314 by Xiomy Sanchez RN  Pain Management Interventions: medication (see MAR)  Intervention: Provide Person-Centered Care  Recent Flowsheet Documentation  Taken 7/3/2022 0100 by Xiomy Sanchez RN  Trust " Relationship/Rapport:    care explained    choices provided    empathic listening provided    emotional support provided    questions answered    questions encouraged  Goal: Readiness for Transition of Care  Outcome: Ongoing, Progressing     Problem: Adjustment to Role Transition (Postpartum  Delivery)  Goal: Successful Maternal Role Transition  Outcome: Ongoing, Progressing     Problem: Bleeding (Postpartum  Delivery)  Goal: Hemostasis  Outcome: Ongoing, Progressing     Problem: Infection (Postpartum  Delivery)  Goal: Absence of Infection Signs and Symptoms  Outcome: Ongoing, Progressing     Problem: Pain (Postpartum  Delivery)  Goal: Acceptable Pain Control  Outcome: Ongoing, Progressing  Intervention: Prevent or Manage Pain  Recent Flowsheet Documentation  Taken 2022 2314 by Xiomy Sanchez, RN  Pain Management Interventions: medication (see MAR)     Problem: Postoperative Nausea and Vomiting (Postpartum  Delivery)  Goal: Nausea and Vomiting Relief  Outcome: Ongoing, Progressing     Problem: Postoperative Urinary Retention (Postpartum  Delivery)  Goal: Effective Urinary Elimination  Outcome: Ongoing, Progressing

## 2022-07-03 NOTE — PLAN OF CARE
"  Problem: Plan of Care - These are the overarching goals to be used throughout the patient stay.    Goal: Plan of Care Review/Shift Note  Description: The Plan of Care Review/Shift note should be completed every shift.  The Outcome Evaluation is a brief statement about your assessment that the patient is improving, declining, or no change.  This information will be displayed automatically on your shift note.  Outcome: Met  Goal: Patient-Specific Goal (Individualized)  Description: You can add care plan individualizations to a care plan. Examples of Individualization might be:  \"Parent requests to be called daily at 9am for status\", \"I have a hard time hearing out of my right ear\", or \"Do not touch me to wake me up as it startles me\".  Outcome: Met  Goal: Absence of Hospital-Acquired Illness or Injury  Outcome: Met  Goal: Optimal Comfort and Wellbeing  Outcome: Met  Intervention: Provide Person-Centered Care  Recent Flowsheet Documentation  Taken 7/3/2022 1000 by Hafsa Muhammad RN  Trust Relationship/Rapport:   emotional support provided   care explained   questions answered   thoughts/feelings acknowledged  Goal: Readiness for Transition of Care  Outcome: Met     "

## 2022-09-15 ENCOUNTER — HOSPITAL ENCOUNTER (EMERGENCY)
Facility: CLINIC | Age: 37
Discharge: HOME | End: 2022-09-15
Admitting: EMERGENCY MEDICINE
Payer: COMMERCIAL

## 2022-09-15 VITALS
HEART RATE: 81 BPM | RESPIRATION RATE: 16 BRPM | TEMPERATURE: 98 F | OXYGEN SATURATION: 98 % | SYSTOLIC BLOOD PRESSURE: 109 MMHG | WEIGHT: 148 LBS | HEIGHT: 60 IN | DIASTOLIC BLOOD PRESSURE: 63 MMHG | BODY MASS INDEX: 29.06 KG/M2

## 2022-09-15 DIAGNOSIS — N61.0 MASTITIS: ICD-10-CM

## 2022-09-15 PROCEDURE — 99282 EMERGENCY DEPT VISIT SF MDM: CPT

## 2022-09-15 RX ORDER — DICLOXACILLIN SODIUM 500 MG
500 CAPSULE ORAL 4 TIMES DAILY
Qty: 28 CAPSULE | Refills: 0 | Status: SHIPPED | OUTPATIENT
Start: 2022-09-15 | End: 2022-09-22

## 2022-09-15 ASSESSMENT — ENCOUNTER SYMPTOMS
FEVER: 0
COUGH: 0
NAUSEA: 0
HEADACHES: 0
LIGHT-HEADEDNESS: 0
WEAKNESS: 0
VOMITING: 0
ABDOMINAL PAIN: 0
SHORTNESS OF BREATH: 0

## 2022-09-15 NOTE — ED TRIAGE NOTES
Complaining of bilateral breast pain for approx 2 months. Right side worse that left and pain on right side is more mid axillary. Did have a baby on 4/1 and has been breast pumping since. States she does feel lumps to right side but denies fever. Denies any drainage from breast.      Triage Assessment     Row Name 09/15/22 1441       Triage Assessment (Adult)    Airway WDL WDL       Respiratory WDL    Respiratory WDL WDL       Skin Circulation/Temperature WDL    Skin Circulation/Temperature WDL WDL       Cardiac WDL    Cardiac WDL WDL       Peripheral/Neurovascular WDL    Peripheral Neurovascular WDL WDL       Cognitive/Neuro/Behavioral WDL    Cognitive/Neuro/Behavioral WDL WDL

## 2022-09-15 NOTE — DISCHARGE INSTRUCTIONS
You were seen and evaluated here in the ED for your bilateral breast pain. I do think that you have mastitis and I will place you on antibiotics for this. I will prescribe you dicloxacillin 500 mg to take 4 times a day for 10 days. Follow up with your OB or PCP if symptoms continue.     Continue to breast feed/pump and use warm compresses. You can take Tylenol and ibuprofen for pain.     Return with new or worsening symptoms.

## 2022-09-15 NOTE — ED NOTES
Suggested that patient use coconut oil/olive oil to massage around the infection.  Patient is trying to decreased her milk supply.    Has been taking acetaminophen and ibuprofen without relief.  Suggested that she change to aleve/naproxene, explained that is in the same medication so maybe change starting tomorrow.    Also suggested that she call the nurse line when she gets home and gets established with [facility] for follow up.  Patient saw Dr Randhawa for OB issues.

## 2022-09-15 NOTE — ED PROVIDER NOTES
EMERGENCY DEPARTMENT ENCOUNTER      NAME: Felicia Mcallister  AGE: 36 year old female  YOB: 1985  MRN: [id number]  EVALUATION DATE & TIME: 9/15/2022  3:10 PM    PCP: No Ref-Primary, Physician    ED PROVIDER: Nadya Jerome PA-C      Chief Complaint   Patient presents with     Breast Pain         FINAL IMPRESSION:  1. Mastitis        MEDICAL DECISION MAKING:    Pertinent Labs & Imaging studies reviewed. (See chart for details)  36 year old female presents to the Emergency Department for evaluation of bilateral breast pain.  The patient has been dealing with pain in her breast for the past several months.  She did give birth back in April and has been breast-feeding since.  She has noticed significant decrease in her milk production with increased pain.  She was treated for mastitis approximately 1 month ago with improvement of her symptoms however, over the past several days she has had significant bilateral breast pain and presents to the emergency department.  On my evaluation, she was vitally stable and afebrile.  Exam with redness to bilateral breasts without any significant engorgement, erythema, warmth or fluctuance.  Her nipples appeared normal and I was able to express normal-appearing milk without any purulence or blood.    History and exam is consistent with mastitis and without any fevers, chills or other concerning symptoms I do not feel work-up with labs or imaging here in the emergency department is indicated.  I will discharge her home on a course of dicloxacillin to take 4 times daily for the next 10 days.  He should continue to massage the breasts, apply warm compresses and pump as this will help with her pain.  If she does not have any improvement of her pain I recommend she follow-up with her primary care physician or OB.  I discussed this with the patient and she was in agreement and understanding with the plan of care.  She can continue to take Tylenol and ibuprofen as needed for  pain at home.  Return precautions were discussed and she was discharged from the emergency department in stable condition.    ED COURSE:  4:05 PM I met with the patient, obtained history, performed an initial exam, and discussed options and plan for diagnostics and treatment here in the ED.    4:30 PM Patient discharged after being provided with extensive anticipatory guidance and given return precautions, importance of PCP follow-up emphasized.    At the conclusion of the encounter I discussed the results of all of the tests and the disposition. The questions were answered. The patient or family acknowledged understanding and was agreeable with the care plan.     MEDICATIONS GIVEN IN THE EMERGENCY:  Medications - No data to display    NEW PRESCRIPTIONS STARTED AT TODAY'S ER VISIT  New Prescriptions    No medications on file            =================================================================    HPI:    Patient information was obtained from: The patient    Use of Interpretor: N/A         Jonycorinnatom Mcallister is a 36 year old female with a pertinent history of gestational diabetes who presents to this ED via private vehicle for evaluation of breast pain.  The past several months the patient has been dealing with intermittent breast pain and was treated for mastitis on the left approximately 1 month ago.  Her breast pain has continued and worsened over the past few days and is now worse on the right.  Despite pumping every 3 hours, using warm compresses and taking Tylenol/ibuprofen at home her pain has not improved.  She called her clinic today and was sent to the emergency department.    On my evaluation, she denies any fevers, chills, belly pain, nausea, vomiting, chest pain, difficulty breathing or any other concerning symptoms.  She denies any significant redness or swelling of her breasts.  She has not noticed any abnormal nipple discharge, but does feel that the area around her nipple is more swollen.       REVIEW OF SYSTEMS:  Review of Systems   Constitutional: Negative for fever.   Respiratory: Negative for cough and shortness of breath.    Cardiovascular: Negative for chest pain and leg swelling.   Gastrointestinal: Negative for abdominal pain, nausea and vomiting.   Musculoskeletal:        Breast pain bilaterally   Neurological: Negative for weakness, light-headedness and headaches.   All other systems reviewed and are negative.        PAST MEDICAL HISTORY:  Past Medical History:   Diagnosis Date     Depressive disorder        PAST SURGICAL HISTORY:  Past Surgical History:   Procedure Laterality Date      SECTION N/A 3/20/2020    Procedure:  SECTION;  Surgeon: Jens To MD;  Location: [facility] OR;  Service: Obstetrics      SECTION N/A 2022    Procedure: REPEAT  SECTION;  Surgeon: Jens To MD;  Location: [facility]           CURRENT MEDICATIONS:    No current facility-administered medications for this encounter.    Current Outpatient Medications:      acetaminophen (TYLENOL) 325 MG tablet, Take 3 tablets (975 mg) by mouth every 6 hours, Disp: , Rfl:      acetaminophen (TYLENOL) 500 MG tablet, [ACETAMINOPHEN (TYLENOL) 500 MG TABLET] Take 2 tablets (1,000 mg total) by mouth every 6 (six) hours as needed for pain., Disp: 60 tablet, Rfl: 2     cholecalciferol (VITAMIN D3) 25 mcg (1000 units) capsule, Take 2 capsules by mouth daily, Disp: , Rfl:      ibuprofen (ADVIL,MOTRIN) 600 MG tablet, [IBUPROFEN (ADVIL,MOTRIN) 600 MG TABLET] Take 1 tablet (600 mg total) by mouth every 6 (six) hours as needed for pain., Disp: 60 tablet, Rfl: 2     ibuprofen (ADVIL/MOTRIN) 800 MG tablet, Take 1 tablet (800 mg) by mouth every 6 hours, Disp: 25 tablet, Rfl: 0     multivit-min/iron/folic/opc035 (HAIR, SKIN AND NAILS ADVANCED ORAL), [MULTIVIT-MIN/IRON/FOLIC/EDU338 (HAIR, SKIN AND NAILS ADVANCED ORAL)] Take by mouth., Disp: , Rfl:      oxyCODONE (ROXICODONE) 5  MG tablet, Take 1 tablet (5 mg) by mouth every 4 hours as needed for moderate to severe pain, Disp: 15 tablet, Rfl: 0     Prenatal Vit-Fe Fumarate-FA (PRENATAL MULTIVITAMIN W/IRON) 27-0.8 MG tablet, Take 1 tablet by mouth daily, Disp: , Rfl:      senna-docusate (SENOKOT-S/PERICOLACE) 8.6-50 MG tablet, Take 2 tablets by mouth 2 times daily as needed for constipation, Disp: , Rfl:       ALLERGIES:  No Known Allergies    FAMILY HISTORY:  No family history on file.    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   Neck: Normal range of motion, No tenderness, Supple, No stridor.  Eyes: Eyes track normally throughout exam, conjunctiva normal, No discharge.   Respiratory: Normal breath sounds, No respiratory distress, No wheezing, Speaks full sentences easily. No cough.  Cardiovascular: Normal heart rate, Regular rhythm, No murmurs, No rubs, No gallops. Chest wall nontender.  Breast: Tenderness to palpation of bilateral breasts.  No significant engorgement of the breasts bilaterally.  No erythema, warmth or fluctuance.  No masses  appreciated.  I was able to express some milk that appeared normal without any purulence, blood.  Her nipples appear normal bilaterally without any significant tenderness to palpation or areas of fluctuance.  GI: Soft, No tenderness, No masses, No rebound or guarding.  Musculoskeletal: Good range of motion in all major joints. No tenderness to palpation or major deformities noted.    Integument: Warm, Dry, No erythema, No rash. No petechiae.  Neurologic: Alert & oriented x 3, No focal deficits noted. Normal gait.  Psychiatric: Affect normal, Judgment normal, Mood normal. Cooperative.    Nadya Jerome PA-C  Emergency Medicine  [facility]  9/15/2022      Nadya Jerome PA-C  09/15/22 1822

## 2022-09-18 ENCOUNTER — HEALTH MAINTENANCE LETTER (OUTPATIENT)
Age: 37
End: 2022-09-18

## 2022-09-29 ENCOUNTER — HOSPITAL ENCOUNTER (OUTPATIENT)
Dept: MAMMOGRAPHY | Facility: CLINIC | Age: 37
Discharge: HOME | End: 2022-09-29
Attending: OBSTETRICS & GYNECOLOGY
Payer: COMMERCIAL

## 2022-09-29 DIAGNOSIS — N64.4 BREAST PAIN IN FEMALE: ICD-10-CM

## 2022-09-29 PROCEDURE — 76642 ULTRASOUND BREAST LIMITED: CPT | Mod: 50

## 2022-09-29 PROCEDURE — 77062 BREAST TOMOSYNTHESIS BI: CPT

## 2023-01-29 ENCOUNTER — HEALTH MAINTENANCE LETTER (OUTPATIENT)
Age: 38
End: 2023-01-29

## 2023-05-18 ENCOUNTER — OFFICE VISIT (OUTPATIENT)
Dept: FAMILY MEDICINE | Facility: CLINIC | Age: 38
End: 2023-05-18
Payer: COMMERCIAL

## 2023-05-18 VITALS
BODY MASS INDEX: 27.09 KG/M2 | TEMPERATURE: 98.3 F | WEIGHT: 138 LBS | SYSTOLIC BLOOD PRESSURE: 120 MMHG | HEART RATE: 63 BPM | DIASTOLIC BLOOD PRESSURE: 80 MMHG | RESPIRATION RATE: 16 BRPM | HEIGHT: 60 IN | OXYGEN SATURATION: 100 %

## 2023-05-18 DIAGNOSIS — Z00.00 ROUTINE GENERAL MEDICAL EXAMINATION AT A HEALTH CARE FACILITY: Primary | ICD-10-CM

## 2023-05-18 DIAGNOSIS — Z11.59 NEED FOR HEPATITIS C SCREENING TEST: ICD-10-CM

## 2023-05-18 LAB
ALBUMIN SERPL BCG-MCNC: 4.9 G/DL (ref 3.5–5.2)
ALP SERPL-CCNC: 63 U/L (ref 35–104)
ALT SERPL W P-5'-P-CCNC: 28 U/L (ref 10–35)
ANION GAP SERPL CALCULATED.3IONS-SCNC: 12 MMOL/L (ref 7–15)
AST SERPL W P-5'-P-CCNC: 25 U/L (ref 10–35)
BILIRUB SERPL-MCNC: 1.8 MG/DL
BUN SERPL-MCNC: 8.8 MG/DL (ref 6–20)
CALCIUM SERPL-MCNC: 9.7 MG/DL (ref 8.6–10)
CHLORIDE SERPL-SCNC: 104 MMOL/L (ref 98–107)
CHOLEST SERPL-MCNC: 214 MG/DL
CREAT SERPL-MCNC: 0.54 MG/DL (ref 0.51–0.95)
DEPRECATED HCO3 PLAS-SCNC: 24 MMOL/L (ref 22–29)
ERYTHROCYTE [DISTWIDTH] IN BLOOD BY AUTOMATED COUNT: 11.9 % (ref 10–15)
GFR SERPL CREATININE-BSD FRML MDRD: >90 ML/MIN/1.73M2
GLUCOSE SERPL-MCNC: 101 MG/DL (ref 70–99)
HCT VFR BLD AUTO: 39.4 % (ref 35–47)
HDLC SERPL-MCNC: 52 MG/DL
HGB BLD-MCNC: 13.6 G/DL (ref 11.7–15.7)
LDLC SERPL CALC-MCNC: 137 MG/DL
MCH RBC QN AUTO: 31.9 PG (ref 26.5–33)
MCHC RBC AUTO-ENTMCNC: 34.5 G/DL (ref 31.5–36.5)
MCV RBC AUTO: 93 FL (ref 78–100)
NONHDLC SERPL-MCNC: 162 MG/DL
PLATELET # BLD AUTO: 335 10E3/UL (ref 150–450)
POTASSIUM SERPL-SCNC: 3.9 MMOL/L (ref 3.4–5.3)
PROT SERPL-MCNC: 7.8 G/DL (ref 6.4–8.3)
RBC # BLD AUTO: 4.26 10E6/UL (ref 3.8–5.2)
SODIUM SERPL-SCNC: 140 MMOL/L (ref 136–145)
TRIGL SERPL-MCNC: 127 MG/DL
TSH SERPL DL<=0.005 MIU/L-ACNC: 1.07 UIU/ML (ref 0.3–4.2)
WBC # BLD AUTO: 4.7 10E3/UL (ref 4–11)

## 2023-05-18 PROCEDURE — 80053 COMPREHEN METABOLIC PANEL: CPT | Performed by: FAMILY MEDICINE

## 2023-05-18 PROCEDURE — 36415 COLL VENOUS BLD VENIPUNCTURE: CPT | Performed by: FAMILY MEDICINE

## 2023-05-18 PROCEDURE — 80061 LIPID PANEL: CPT | Performed by: FAMILY MEDICINE

## 2023-05-18 PROCEDURE — 87340 HEPATITIS B SURFACE AG IA: CPT | Performed by: FAMILY MEDICINE

## 2023-05-18 PROCEDURE — 99395 PREV VISIT EST AGE 18-39: CPT | Performed by: FAMILY MEDICINE

## 2023-05-18 PROCEDURE — 84443 ASSAY THYROID STIM HORMONE: CPT | Performed by: FAMILY MEDICINE

## 2023-05-18 PROCEDURE — 85027 COMPLETE CBC AUTOMATED: CPT | Performed by: FAMILY MEDICINE

## 2023-05-18 PROCEDURE — 86706 HEP B SURFACE ANTIBODY: CPT | Performed by: FAMILY MEDICINE

## 2023-05-18 PROCEDURE — 86803 HEPATITIS C AB TEST: CPT | Performed by: FAMILY MEDICINE

## 2023-05-18 ASSESSMENT — ENCOUNTER SYMPTOMS
COUGH: 0
ABDOMINAL PAIN: 0
CHILLS: 0
HEMATURIA: 0
CONSTIPATION: 0
HEMATOCHEZIA: 0

## 2023-05-18 NOTE — PROGRESS NOTES
SUBJECTIVE:   CC: Felicia is an 37 year old who presents for preventive health visit.        View : No data to display.              Patient has been advised of split billing requirements and indicates understanding: Yes  Healthy Habits:     Getting at least 3 servings of Calcium per day:  NO    Bi-annual eye exam:  NO    Dental care twice a year:  NO    Sleep apnea or symptoms of sleep apnea:  None    Diet:  Regular (no restrictions)    Frequency of exercise:  None    Taking medications regularly:  Yes    Medication side effects:  None    PHQ-2 Total Score: 0    Additional concerns today:  No                  Today's PHQ-2 Score:       2023    11:38 AM   PHQ-2 (  [employer])   Q1: Little interest or pleasure in doing things 0   Q2: Feeling down, depressed or hopeless 0   PHQ-2 Score 0   Q1: Little interest or pleasure in doing things Not at all   Q2: Feeling down, depressed or hopeless Not at all   PHQ-2 Score 0       Have you ever done Advance Care Planning? (For example, a Health Directive, POLST, or a discussion with a medical provider or your loved ones about your wishes): No, advance care planning information given to patient to review.  Advanced care planning was discussed at today's visit.    Social History     Tobacco Use     Smoking status: Never     Smokeless tobacco: Never   Vaping Use     Vaping status: Not on file   Substance Use Topics     Alcohol use: Not Currently             2023    11:37 AM   Alcohol Use   Prescreen: >3 drinks/day or >7 drinks/week? No     Reviewed orders with patient.  Reviewed health maintenance and updated orders accordingly - Yes  Lab work is in process  Labs reviewed in EPIC    Breast Cancer Screenin/18/2023    11:38 AM   Breast CA Risk Assessment (FHS-7)   Do you have a family history of breast, colon, or ovarian cancer? No / Unknown         Patient under 40 years of age: Routine Mammogram Screening not recommended.   Pertinent mammograms are reviewed  under the imaging tab.    History of abnormal Pap smear: NO - age 30-65 PAP every 5 years with negative HPV co-testing recommended      9/10/2020    12:00 AM 8/29/2019    12:00 AM   PAP / HPV   HPV_EXT - HISTORICAL See Scanned Report   See Scanned Report       Reviewed and updated as needed this visit by clinical staff   Tobacco  Allergies  Meds              Reviewed and updated as needed this visit by Provider                     Review of Systems   Constitutional: Negative for chills.   HENT: Negative for congestion.    Respiratory: Negative for cough.    Cardiovascular: Negative for chest pain.   Gastrointestinal: Negative for abdominal pain, constipation and hematochezia.   Genitourinary: Negative for hematuria.     CONSTITUTIONAL: NEGATIVE for fever, chills, change in weight  INTEGUMENTARU/SKIN: NEGATIVE for worrisome rashes, moles or lesions  EYES: NEGATIVE for vision changes or irritation  ENT: NEGATIVE for ear, mouth and throat problems  RESP: NEGATIVE for significant cough or SOB  BREAST: NEGATIVE for masses, tenderness or discharge  CV: NEGATIVE for chest pain, palpitations or peripheral edema  GI: NEGATIVE for nausea, abdominal pain, heartburn, or change in bowel habits  : NEGATIVE for unusual urinary or vaginal symptoms. Periods are regular.  MUSCULOSKELETAL: NEGATIVE for significant arthralgias or myalgia  NEURO: NEGATIVE for weakness, dizziness or paresthesias  PSYCHIATRIC: NEGATIVE for changes in mood or affect     OBJECTIVE:   LMP 04/20/2023   Breastfeeding No   Physical Exam  GENERAL: healthy, alert and no distress  EYES: Eyes grossly normal to inspection, PERRL and conjunctivae and sclerae normal  HENT: ear canals and TM's normal, nose and mouth without ulcers or lesions  NECK: no adenopathy, no asymmetry, masses, or scars and thyroid normal to palpation  RESP: lungs clear to auscultation - no rales, rhonchi or wheezes  BREAST: normal without masses, tenderness or nipple discharge and no  palpable axillary masses or adenopathy  CV: regular rate and rhythm, normal S1 S2, no S3 or S4, no murmur, click or rub, no peripheral edema and peripheral pulses strong  ABDOMEN: soft, nontender, no hepatosplenomegaly, no masses and bowel sounds normal  MS: no gross musculoskeletal defects noted, no edema  SKIN: no suspicious lesions or rashes  NEURO: Normal strength and tone, mentation intact and speech normal  PSYCH: mentation appears normal, affect normal/bright    Diagnostic Test Results:  Labs reviewed in Epic    ASSESSMENT/PLAN:   (Z00.00) Routine general medical examination at a health care facility  (primary encounter diagnosis)  Comment: encourage annual pe and vaccine up date  Plan: Lipid panel reflex to direct LDL Fasting,         Comprehensive metabolic panel (BMP + Alb, Alk         Phos, ALT, AST, Total. Bili, TP), TSH with free        T4 reflex, CBC with platelets, Hepatitis B         Surface Antibody, Hepatitis B surface antigen            (Z11.59) Need for hepatitis C screening test  Comment:   Plan: Hepatitis C Screen Reflex to HCV RNA Quant and         Genotype              Patient has been advised of split billing requirements and indicates understanding: Yes      COUNSELING:  Reviewed preventive health counseling, as reflected in patient instructions       Regular exercise       Healthy diet/nutrition       Vision screening       Immunizations    Declined: Covid-19 and Hepatitis B due to Concerns about side effects/safety               Contraception       Family planning       Osteoporosis prevention/bone health       Consider Hep C screening for all patients one time for ages 18-79 years      BMI:   Estimated body mass index is 28.9 kg/m  as calculated from the following:    Height as of 9/15/22: 1.524 m (5').    Weight as of 9/15/22: 67.1 kg (148 lb).   Weight management plan: Discussed healthy diet and exercise guidelines      She reports that she has never smoked. She has never used  smokeless tobacco.          Yasmine Meléndez MD  [facility]

## 2023-05-19 LAB
HBV SURFACE AG SERPL QL IA: NONREACTIVE
HCV AB SERPL QL IA: NONREACTIVE

## 2023-05-22 LAB
HBV SURFACE AB SERPL IA-ACNC: 10.11 M[IU]/ML
HBV SURFACE AB SERPL IA-ACNC: NORMAL M[IU]/ML

## 2023-06-16 ENCOUNTER — OFFICE VISIT (OUTPATIENT)
Dept: FAMILY MEDICINE | Facility: CLINIC | Age: 38
End: 2023-06-16
Payer: COMMERCIAL

## 2023-06-16 VITALS
OXYGEN SATURATION: 100 % | WEIGHT: 139.8 LBS | BODY MASS INDEX: 27.45 KG/M2 | HEART RATE: 69 BPM | TEMPERATURE: 98.4 F | RESPIRATION RATE: 18 BRPM | SYSTOLIC BLOOD PRESSURE: 126 MMHG | HEIGHT: 60 IN | DIASTOLIC BLOOD PRESSURE: 79 MMHG

## 2023-06-16 DIAGNOSIS — R19.06 EPIGASTRIC MASS: Primary | ICD-10-CM

## 2023-06-16 DIAGNOSIS — R73.01 ELEVATED FASTING BLOOD SUGAR: ICD-10-CM

## 2023-06-16 DIAGNOSIS — R17 ELEVATED BILIRUBIN: ICD-10-CM

## 2023-06-16 LAB
BILIRUB SERPL-MCNC: 0.7 MG/DL
HBA1C MFR BLD: 5.4 % (ref 0–5.6)

## 2023-06-16 PROCEDURE — 82247 BILIRUBIN TOTAL: CPT | Performed by: FAMILY MEDICINE

## 2023-06-16 PROCEDURE — 36415 COLL VENOUS BLD VENIPUNCTURE: CPT | Performed by: FAMILY MEDICINE

## 2023-06-16 PROCEDURE — 90746 HEPB VACCINE 3 DOSE ADULT IM: CPT | Performed by: FAMILY MEDICINE

## 2023-06-16 PROCEDURE — 90471 IMMUNIZATION ADMIN: CPT | Performed by: FAMILY MEDICINE

## 2023-06-16 PROCEDURE — 99213 OFFICE O/P EST LOW 20 MIN: CPT | Mod: 25 | Performed by: FAMILY MEDICINE

## 2023-06-16 PROCEDURE — 83036 HEMOGLOBIN GLYCOSYLATED A1C: CPT | Performed by: FAMILY MEDICINE

## 2023-06-16 ASSESSMENT — PAIN SCALES - GENERAL: PAINLEVEL: MODERATE PAIN (4)

## 2023-06-16 NOTE — NURSING NOTE
Prior to immunization administration, verified patients identity using patient s name and date of birth. Please see Immunization Activity for additional information.     Screening Questionnaire for Adult Immunization    Are you sick today?   No   Do you have allergies to medications, food, a vaccine component or latex?   No   Have you ever had a serious reaction after receiving a vaccination?   No   Do you have a long-term health problem with heart, lung, kidney, or metabolic disease (e.g., diabetes), asthma, a blood disorder, no spleen, complement component deficiency, a cochlear implant, or a spinal fluid leak?  Are you on long-term aspirin therapy?   No   Do you have cancer, leukemia, HIV/AIDS, or any other immune system problem?   No   Do you have a parent, brother, or sister with an immune system problem?   No   In the past 3 months, have you taken medications that affect  your immune system, such as prednisone, other steroids, or anticancer drugs; drugs for the treatment of rheumatoid arthritis, Crohn s disease, or psoriasis; or have you had radiation treatments?   No   Have you had a seizure, or a brain or other nervous system problem?   No   During the past year, have you received a transfusion of blood or blood    products, or been given immune (gamma) globulin or antiviral drug?   No   For women: Are you pregnant or is there a chance you could become       pregnant during the next month?   No   Have you received any vaccinations in the past 4 weeks?   No     Immunization questionnaire answers were all negative.    Patient instructed to remain in clinic for 15 minutes afterwards, and to report any adverse reactions.     Screening performed by Cintia Lopez RN on 6/16/2023 at 7:50 AM.

## 2023-06-16 NOTE — PROGRESS NOTES
"  Assessment & Plan     Epigastric mass  - US Abdomen Limited    ---    Follow up previously mildly abnormal labs    Elevated fasting blood sugar  - Hemoglobin A1c    Elevated bilirubin  - Bilirubin,  Total    Shirin Malloy MD  [facility]    Suha Duarte is a 37 year old, presenting for the following health issues:  OFFICE VISIT  and Recheck Medication (Pt is here due to rib pain that's being going since she was pregnant with son.)        6/16/2023     7:18 AM   Additional Questions   Roomed by alfreda   Accompanied by tatiana         6/16/2023     7:18 AM   Patient Reported Additional Medications   Patient reports taking the following new medications none     History of Present Illness       Reason for visit:  Having pain in the middle of my ribcage    She eats 2-3 servings of fruits and vegetables daily.She consumes 1 sweetened beverage(s) daily.She exercises with enough effort to increase her heart rate 9 or less minutes per day.  She exercises with enough effort to increase her heart rate 3 or less days per week.   She is taking medications regularly.     Pain just below xyphoid.  Started when she was pregnant. Her 1st baby in 3/2020 and second 7/2022 (both c-sections) - at 6 months pregnant she felt a pressure there (in epigastric area)  and this  never goes away.  Given omeprazole before - did not feel any improvement while taking this.   Doesn't  feel pain but rather pressure.  If she lies on her side it is better, but when she lies on her back it is uncomfortable.    Pressure is there all the time - \" I just try to ignore it\" - however if she bends over she it feels worse - if feels like something is getting caught   - has never had an endoscopy   - eating does not make a difference   - sometimes when she eats a lot it is uncomfortable - feels all the way to the top          Objective    /79 (BP Location: Left arm, Patient Position: Sitting, Cuff Size: Adult Regular) " "  Pulse 69   Temp 98.4  F (36.9  C) (Oral)   Resp 18   Ht 1.534 m (5' 0.39\")   Wt 63.4 kg (139 lb 12.8 oz)   LMP 05/20/2023 (Exact Date)   SpO2 100%   BMI 26.95 kg/m    Body mass index is 26.95 kg/m .  Physical Exam   General appearance - alert, well appearing, and in no distress  Mental status - normal mood, behavior, speech, dress, motor activity, and thought processes  Abdomen - Abdomen is soft with no organomegaly.  She has a tender, golf ball sized mass in epigastric area                  "

## 2023-06-17 ENCOUNTER — HOSPITAL ENCOUNTER (OUTPATIENT)
Dept: ULTRASOUND IMAGING | Facility: CLINIC | Age: 38
Discharge: HOME | End: 2023-06-17
Attending: FAMILY MEDICINE | Admitting: FAMILY MEDICINE
Payer: COMMERCIAL

## 2023-06-17 DIAGNOSIS — R19.06 EPIGASTRIC MASS: ICD-10-CM

## 2023-06-17 PROCEDURE — 76705 ECHO EXAM OF ABDOMEN: CPT

## 2023-06-19 ENCOUNTER — TELEPHONE (OUTPATIENT)
Dept: FAMILY MEDICINE | Facility: CLINIC | Age: 38
End: 2023-06-19
Payer: MEDICAID

## 2023-06-19 DIAGNOSIS — K43.9 EPIGASTRIC HERNIA: Primary | ICD-10-CM

## 2023-06-19 NOTE — TELEPHONE ENCOUNTER
Test Results    Contacts       Type Contact Phone/Fax    06/19/2023 09:13 AM CDT Phone (Incoming) Felicia Mcallister (Self) [phone] (M)          Who ordered the test:  Dr Malloy    Type of test:     Date of test:  6/17/23    Where was the test performed:  [facility]    What are your questions/concerns?:  Pt is looking for her results    Could we [facility] this [facility] or would you prefer to receive a phone call?:   Patient would prefer a phone call   Okay to leave a detailed message?: Yes at Cell number on file:    Telephone Information:   Mobile [phone]

## 2023-06-29 ENCOUNTER — OFFICE VISIT (OUTPATIENT)
Dept: SURGERY | Facility: CLINIC | Age: 38
End: 2023-06-29
Attending: FAMILY MEDICINE
Payer: COMMERCIAL

## 2023-06-29 VITALS — BODY MASS INDEX: 25.64 KG/M2 | WEIGHT: 133 LBS

## 2023-06-29 DIAGNOSIS — K43.9 EPIGASTRIC HERNIA: ICD-10-CM

## 2023-06-29 PROCEDURE — 99242 OFF/OP CONSLTJ NEW/EST SF 20: CPT | Performed by: SURGERY

## 2023-06-29 NOTE — PROGRESS NOTES
HPI: Felicia Mcallister is a 37 year old female referred to see me by Yasmine Meléndez for an epigastric mass.  She notes she is been having tenderness and pain in the epigastric region associated with a small lump, as well as discomfort in her left mid abdomen.   She was seen for this initially by Dr. Malloy, who with the suspicion of a hernia sent her for a confirmatory ultrasound.  Ultrasound imaging did not identify a hernia nor any other intra-abdominal pathology.  Given Dr. Malloy's suspicion she reached out to see if we would see her as well.    Allergies:Patient has no known allergies.    Prior Medical History:   Past Medical History:   Diagnosis Date     Depressive disorder        Prior Surgical History:   Past Surgical History:   Procedure Laterality Date      SECTION N/A 3/20/2020    Procedure:  SECTION;  Surgeon: Jens To MD;  Location: [facility] OR;  Service: Obstetrics      SECTION N/A 2022    Procedure: REPEAT  SECTION;  Surgeon: Jens To MD;  Location: [facility]       CURRENT MEDS:  Prior to Admission medications    Medication Sig Start Date End Date Taking? Authorizing Provider   cholecalciferol (VITAMIN D3) 25 mcg (1000 units) capsule Take 2 capsules by mouth daily   Yes Reported, Patient   multivit-min/iron/folic/[id number] (HAIR, SKIN AND NAILS ADVANCED ORAL) [MULTIVIT-MIN/IRON/FOLIC/[id number] (HAIR, SKIN AND NAILS ADVANCED ORAL)] Take by mouth. 10/16/20  Yes Provider, Historical         History reviewed. No pertinent family history.     reports that she has never smoked. She has never used smokeless tobacco. She reports that she does not currently use alcohol. She reports that she does not use drugs.    History   Smoking Status     Never   Smokeless Tobacco     Never       Review of Systems -    The 10 point review of systems is within normal limits except as noted in HPI.    Wt 60.3 kg (133 lb)   LMP 2023 (Exact Date)    BMI 25.64 kg/m    133 lbs 0 oz  Body mass index is 25.64 kg/m .    EXAM:  GENERAL: Alert, cooperative, appears stated age  ABDOMEN: Soft, nondistended.  Area of focal tenderness in the epigastric region corresponds with the tip of the xiphoid process, with no palpable or identifiable hernia in this location or anywhere else on the abdomen.      LABS:  Lab Results   Component Value Date    HGB 13.6 05/18/2023    WBC 4.7 05/18/2023     05/18/2023    AST 25 05/18/2023    ALT 28 05/18/2023    ALKPHOS 63 05/18/2023    BILITOTAL 0.7 06/16/2023           Assessment/Plan:   1. Epigastric hernia          Felicia Mcallister is a 37 year old female with signs and symptoms consistent with a tender but anatomically and physiologically normal xiphoid process.  No evidence for any hernias.    10 minutes spent on the date of the encounter doing chart review, patient visit and documentation    Riki Ramirez MD ,MD  [facility] Department of Surgery

## 2023-06-29 NOTE — LETTER
2023         RE: Felicia Mcallister  [address]  [place] [address]        Dear Colleague,    Thank you for referring your patient, Felicia Mcallister, to the [facility] [place]. Please see a copy of my visit note below.    HPI: Felicia Mcallister is a 37 year old female referred to see me by Yasmine Meléndez for an epigastric mass.  She notes she is been having tenderness and pain in the epigastric region associated with a small lump, as well as discomfort in her left mid abdomen.   She was seen for this initially by Dr. Malloy, who with the suspicion of a hernia sent her for a confirmatory ultrasound.  Ultrasound imaging did not identify a hernia nor any other intra-abdominal pathology.  Given Dr. Malloy's suspicion she reached out to see if we would see her as well.    Allergies:Patient has no known allergies.    Prior Medical History:   Past Medical History:   Diagnosis Date     Depressive disorder        Prior Surgical History:   Past Surgical History:   Procedure Laterality Date      SECTION N/A 3/20/2020    Procedure:  SECTION;  Surgeon: Jens To MD;  Location: [facility];  Service: Obstetrics      SECTION N/A 2022    Procedure: REPEAT  SECTION;  Surgeon: Jens To MD;  Location: [facility]       CURRENT MEDS:  Prior to Admission medications    Medication Sig Start Date End Date Taking? Authorizing Provider   cholecalciferol (VITAMIN D3) 25 mcg (1000 units) capsule Take 2 capsules by mouth daily   Yes Reported, Patient   multivit-min/iron/folic/[id number] (HAIR, SKIN AND NAILS ADVANCED ORAL) [MULTIVIT-MIN/IRON/FOLIC/ZGV182 (HAIR, SKIN AND NAILS ADVANCED ORAL)] Take by mouth. 10/16/20  Yes Provider, Historical         History reviewed. No pertinent family history.     reports that she has never smoked. She has never used smokeless tobacco. She reports that she does not currently  use alcohol. She reports that she does not use drugs.    History   Smoking Status     Never   Smokeless Tobacco     Never       Review of Systems -    The 10 point review of systems is within normal limits except as noted in HPI.    Wt 60.3 kg (133 lb)   LMP 05/20/2023 (Exact Date)   BMI 25.64 kg/m    133 lbs 0 oz  Body mass index is 25.64 kg/m .    EXAM:  GENERAL: Alert, cooperative, appears stated age  ABDOMEN: Soft, nondistended.  Area of focal tenderness in the epigastric region corresponds with the tip of the xiphoid process, with no palpable or identifiable hernia in this location or anywhere else on the abdomen.      LABS:  Lab Results   Component Value Date    HGB 13.6 05/18/2023    WBC 4.7 05/18/2023     05/18/2023    AST 25 05/18/2023    ALT 28 05/18/2023    ALKPHOS 63 05/18/2023    BILITOTAL 0.7 06/16/2023           Assessment/Plan:   1. Epigastric hernia          Felicia Mcallister is a 37 year old female with signs and symptoms consistent with a tender but anatomically and physiologically normal xiphoid process.  No evidence for any hernias.    10 minutes spent on the date of the encounter doing chart review, patient visit and documentation    Riki Ramirez MD ,MD  [facility] Department of Surgery      Again, thank you for allowing me to participate in the care of your patient.        Sincerely,        Riki Ramirez MD

## 2023-09-13 ENCOUNTER — OFFICE VISIT (OUTPATIENT)
Dept: FAMILY MEDICINE | Facility: CLINIC | Age: 38
End: 2023-09-13
Payer: COMMERCIAL

## 2023-09-13 VITALS
WEIGHT: 142 LBS | HEIGHT: 60 IN | HEART RATE: 61 BPM | DIASTOLIC BLOOD PRESSURE: 72 MMHG | OXYGEN SATURATION: 98 % | BODY MASS INDEX: 27.88 KG/M2 | TEMPERATURE: 98.3 F | SYSTOLIC BLOOD PRESSURE: 104 MMHG | RESPIRATION RATE: 14 BRPM

## 2023-09-13 DIAGNOSIS — R10.84 ABDOMINAL PAIN, GENERALIZED: Primary | ICD-10-CM

## 2023-09-13 LAB
ALBUMIN SERPL BCG-MCNC: 4.8 G/DL (ref 3.5–5.2)
ALP SERPL-CCNC: 60 U/L (ref 35–104)
ALT SERPL W P-5'-P-CCNC: 48 U/L (ref 0–50)
ANION GAP SERPL CALCULATED.3IONS-SCNC: 10 MMOL/L (ref 7–15)
AST SERPL W P-5'-P-CCNC: 40 U/L (ref 0–45)
BASOPHILS # BLD AUTO: 0 10E3/UL (ref 0–0.2)
BASOPHILS NFR BLD AUTO: 0 %
BILIRUB SERPL-MCNC: 1.4 MG/DL
BUN SERPL-MCNC: 6 MG/DL (ref 6–20)
CALCIUM SERPL-MCNC: 9.7 MG/DL (ref 8.6–10)
CHLORIDE SERPL-SCNC: 105 MMOL/L (ref 98–107)
CREAT SERPL-MCNC: 0.56 MG/DL (ref 0.51–0.95)
DEPRECATED HCO3 PLAS-SCNC: 26 MMOL/L (ref 22–29)
EGFRCR SERPLBLD CKD-EPI 2021: >90 ML/MIN/1.73M2
EOSINOPHIL # BLD AUTO: 0.1 10E3/UL (ref 0–0.7)
EOSINOPHIL NFR BLD AUTO: 1 %
ERYTHROCYTE [DISTWIDTH] IN BLOOD BY AUTOMATED COUNT: 11.8 % (ref 10–15)
GLUCOSE SERPL-MCNC: 104 MG/DL (ref 70–99)
HCT VFR BLD AUTO: 38.1 % (ref 35–47)
HGB BLD-MCNC: 13.1 G/DL (ref 11.7–15.7)
IMM GRANULOCYTES # BLD: 0 10E3/UL
IMM GRANULOCYTES NFR BLD: 0 %
LYMPHOCYTES # BLD AUTO: 2 10E3/UL (ref 0.8–5.3)
LYMPHOCYTES NFR BLD AUTO: 47 %
MCH RBC QN AUTO: 32 PG (ref 26.5–33)
MCHC RBC AUTO-ENTMCNC: 34.4 G/DL (ref 31.5–36.5)
MCV RBC AUTO: 93 FL (ref 78–100)
MONOCYTES # BLD AUTO: 0.3 10E3/UL (ref 0–1.3)
MONOCYTES NFR BLD AUTO: 7 %
NEUTROPHILS # BLD AUTO: 1.9 10E3/UL (ref 1.6–8.3)
NEUTROPHILS NFR BLD AUTO: 45 %
PLATELET # BLD AUTO: 306 10E3/UL (ref 150–450)
POTASSIUM SERPL-SCNC: 3.6 MMOL/L (ref 3.4–5.3)
PROT SERPL-MCNC: 7.5 G/DL (ref 6.4–8.3)
RBC # BLD AUTO: 4.1 10E6/UL (ref 3.8–5.2)
SODIUM SERPL-SCNC: 141 MMOL/L (ref 136–145)
WBC # BLD AUTO: 4.3 10E3/UL (ref 4–11)

## 2023-09-13 PROCEDURE — 36415 COLL VENOUS BLD VENIPUNCTURE: CPT | Performed by: FAMILY MEDICINE

## 2023-09-13 PROCEDURE — 90746 HEPB VACCINE 3 DOSE ADULT IM: CPT | Performed by: FAMILY MEDICINE

## 2023-09-13 PROCEDURE — 80053 COMPREHEN METABOLIC PANEL: CPT | Performed by: FAMILY MEDICINE

## 2023-09-13 PROCEDURE — 90471 IMMUNIZATION ADMIN: CPT | Performed by: FAMILY MEDICINE

## 2023-09-13 PROCEDURE — 85025 COMPLETE CBC W/AUTO DIFF WBC: CPT | Performed by: FAMILY MEDICINE

## 2023-09-13 PROCEDURE — 99214 OFFICE O/P EST MOD 30 MIN: CPT | Mod: 25 | Performed by: FAMILY MEDICINE

## 2023-09-13 NOTE — PROGRESS NOTES
"  Assessment & Plan     (R10.16) Abdominal pain, generalized  (primary encounter diagnosis)  Comment: pt has left abd pain for 3 month. Pt feels burning sensation, mild to moderate, daily. No radiation. Nothing trigger it. Tylenol prn helped. Denies fever, n/v/d/c, weight loss, black stool, blood in stool, blood in urine and dysuria. She has regular period. No heavy lifting and injury. Non smoker and no alcohol.   Reviewed the chart pt had abd us at 6/17/2023 \" Cholelithiasis with stone lodged in the neck of the gallbladder. Hepatic steatosis.\". exam: left abd mild tenderness. Otherwise not remarkable. Normal cbc   Unknown etiology. DDX; constipation, IBS, colitis, abd wall strain etc  Plan: CT Abdomen Pelvis w Contrast, Comprehensive         metabolic panel (BMP + Alb, Alk Phos, ALT, AST,        Total. Bili, TP), CBC with platelets and         differential, Adult GI  Referral -         Consult Only        Will follow-up abd ct. Advise to have gi consult.           See Patient Instructions    Yasmine Meléndez MD  [facility] DIO Duarte is a 37 year old, presenting for the following health issues:  Abdominal Pain (Burning pain on left side of abd x 1 month getting worse)      9/13/2023     8:08 AM   Additional Questions   Roomed by Gen DANIEL CMA       History of Present Illness       Reason for visit:  Pain in my left side of my abdomen  Symptom onset:  More than a month    She eats 0-1 servings of fruits and vegetables daily.She consumes 2 sweetened beverage(s) daily.She exercises with enough effort to increase her heart rate 9 or less minutes per day.  She exercises with enough effort to increase her heart rate 3 or less days per week.   She is taking medications regularly.               Review of Systems   Constitutional, HEENT, cardiovascular, pulmonary, gi and gu systems are negative, except as otherwise noted.      Objective    /72   Pulse 61   Temp 98.3  F (36.8 "  C) (Oral)   Resp 14   Ht 1.524 m (5')   Wt 64.4 kg (142 lb)   LMP 09/09/2023   SpO2 98%   Breastfeeding No   BMI 27.73 kg/m    Body mass index is 27.73 kg/m .  Physical Exam   GENERAL: healthy, alert and no distress  NECK: no adenopathy, no asymmetry, masses, or scars and thyroid normal to palpation  RESP: lungs clear to auscultation - no rales, rhonchi or wheezes  CV: regular rate and rhythm, normal S1 S2, no S3 or S4, no murmur, click or rub, no peripheral edema and peripheral pulses strong  ABDOMEN: soft,  , no hepatosplenomegaly, no masses and bowel sounds normal. Left abd upper and lower mild tenderness.   MS: no gross musculoskeletal defects noted, no edema         Prior to immunization administration, verified patients identity using patient s name and date of birth. Please see Immunization Activity for additional information.     Screening Questionnaire for Adult Immunization    Are you sick today?   No   Do you have allergies to medications, food, a vaccine component or latex?   No   Have you ever had a serious reaction after receiving a vaccination?   No   Do you have a long-term health problem with heart, lung, kidney, or metabolic disease (e.g., diabetes), asthma, a blood disorder, no spleen, complement component deficiency, a cochlear implant, or a spinal fluid leak?  Are you on long-term aspirin therapy?   No   Do you have cancer, leukemia, HIV/AIDS, or any other immune system problem?   No   Do you have a parent, brother, or sister with an immune system problem?   No   In the past 3 months, have you taken medications that affect  your immune system, such as prednisone, other steroids, or anticancer drugs; drugs for the treatment of rheumatoid arthritis, Crohn s disease, or psoriasis; or have you had radiation treatments?   No   Have you had a seizure, or a brain or other nervous system problem?   No   During the past year, have you received a transfusion of blood or blood    products, or been  given immune (gamma) globulin or antiviral drug?   No   For women: Are you pregnant or is there a chance you could become       pregnant during the next month?   No   Have you received any vaccinations in the past 4 weeks?   No     Immunization questionnaire answers were all negative.      Patient instructed to remain in clinic for 15 minutes afterwards, and to report any adverse reactions.     Screening performed by Sandra Angel CMA on 9/13/2023 at 8:38 AM.               [id number]

## 2023-09-24 ENCOUNTER — HOSPITAL ENCOUNTER (OUTPATIENT)
Dept: CT IMAGING | Facility: CLINIC | Age: 38
Discharge: HOME | End: 2023-09-24
Attending: FAMILY MEDICINE | Admitting: FAMILY MEDICINE
Payer: COMMERCIAL

## 2023-09-24 DIAGNOSIS — R10.84 ABDOMINAL PAIN, GENERALIZED: ICD-10-CM

## 2023-09-24 PROCEDURE — 250N000011 HC RX IP 250 OP 636: Mod: JZ | Performed by: FAMILY MEDICINE

## 2023-09-24 PROCEDURE — 74177 CT ABD & PELVIS W/CONTRAST: CPT

## 2023-09-24 RX ORDER — IOPAMIDOL 755 MG/ML
90 INJECTION, SOLUTION INTRAVASCULAR ONCE
Status: COMPLETED | OUTPATIENT
Start: 2023-09-24 | End: 2023-09-24

## 2023-09-24 RX ADMIN — IOPAMIDOL 90 ML: 755 INJECTION, SOLUTION INTRAVENOUS at 10:46

## 2024-04-17 ENCOUNTER — OFFICE VISIT (OUTPATIENT)
Dept: FAMILY MEDICINE | Facility: CLINIC | Age: 39
End: 2024-04-17
Payer: COMMERCIAL

## 2024-04-17 VITALS
TEMPERATURE: 98.3 F | HEART RATE: 65 BPM | BODY MASS INDEX: 28.86 KG/M2 | DIASTOLIC BLOOD PRESSURE: 62 MMHG | HEIGHT: 60 IN | RESPIRATION RATE: 14 BRPM | OXYGEN SATURATION: 99 % | SYSTOLIC BLOOD PRESSURE: 100 MMHG | WEIGHT: 147 LBS

## 2024-04-17 DIAGNOSIS — R10.32 ABDOMINAL PAIN, LEFT LOWER QUADRANT: Primary | ICD-10-CM

## 2024-04-17 DIAGNOSIS — K76.0 FATTY LIVER: ICD-10-CM

## 2024-04-17 LAB
BASOPHILS # BLD AUTO: 0 10E3/UL (ref 0–0.2)
BASOPHILS NFR BLD AUTO: 0 %
EOSINOPHIL # BLD AUTO: 0.1 10E3/UL (ref 0–0.7)
EOSINOPHIL NFR BLD AUTO: 1 %
ERYTHROCYTE [DISTWIDTH] IN BLOOD BY AUTOMATED COUNT: 12.1 % (ref 10–15)
HCT VFR BLD AUTO: 41.1 % (ref 35–47)
HGB BLD-MCNC: 14.2 G/DL (ref 11.7–15.7)
IMM GRANULOCYTES # BLD: 0 10E3/UL
IMM GRANULOCYTES NFR BLD: 0 %
LYMPHOCYTES # BLD AUTO: 2 10E3/UL (ref 0.8–5.3)
LYMPHOCYTES NFR BLD AUTO: 36 %
MCH RBC QN AUTO: 31.7 PG (ref 26.5–33)
MCHC RBC AUTO-ENTMCNC: 34.5 G/DL (ref 31.5–36.5)
MCV RBC AUTO: 92 FL (ref 78–100)
MONOCYTES # BLD AUTO: 0.4 10E3/UL (ref 0–1.3)
MONOCYTES NFR BLD AUTO: 7 %
NEUTROPHILS # BLD AUTO: 3.1 10E3/UL (ref 1.6–8.3)
NEUTROPHILS NFR BLD AUTO: 56 %
PLATELET # BLD AUTO: 310 10E3/UL (ref 150–450)
RBC # BLD AUTO: 4.48 10E6/UL (ref 3.8–5.2)
WBC # BLD AUTO: 5.6 10E3/UL (ref 4–11)

## 2024-04-17 PROCEDURE — 80053 COMPREHEN METABOLIC PANEL: CPT | Performed by: FAMILY MEDICINE

## 2024-04-17 PROCEDURE — 99214 OFFICE O/P EST MOD 30 MIN: CPT | Performed by: FAMILY MEDICINE

## 2024-04-17 PROCEDURE — 85025 COMPLETE CBC W/AUTO DIFF WBC: CPT | Performed by: FAMILY MEDICINE

## 2024-04-17 PROCEDURE — 36415 COLL VENOUS BLD VENIPUNCTURE: CPT | Performed by: FAMILY MEDICINE

## 2024-04-17 NOTE — PROGRESS NOTES
Assessment & Plan     (R10.32) Abdominal pain, left lower quadrant  (primary encounter diagnosis)  Comment: pt has left abd pain for > 3 years. At beginning it was noticeable but tolerable. It gradually getting worse, now she feels the pain all the time and moderate burning pain constant. No radiation. She state eating make the pain worse. Nothing make the pain better. Denies n/v/d/c, dysuria, fever, weight loss, abnormal vaginal discharge and bleeding. Her period is regular. She had 2 c section but the pain started before her surgery. We reviewed the previous us and ct and no positive  finding to explain her pain. Exam: left abd tenderness mild on upper and moderate on lower. Unknown etiology. Ddx: IBS, ovary cyst, gastritis etc  Plan: CBC with platelets and differential,         Comprehensive metabolic panel (BMP + Alb, Alk         Phos, ALT, AST, Total. Bili, TP), US Pelvic         Complete with Transvaginal, Adult GI          Referral - Consult Only       Will follow-up lab and pelvic us. Will have gi consult. Advise to have consult with ob/gyn.         (K76.0) Fatty liver   Comment: we reviewed the ct at 9/2023, pt has Mild diffuse hepatic steatosis. Cholelithiasis. Pt dose not drink alcohol no Rx medication. No pain on RUQ. Normal LFT  Plan: Adult GI          Referral - Consult Only    advise low fat diet. Will monitor. Avoid alcohol. Advise to have gi consult.   BMI  Estimated body mass index is 28.71 kg/m  as calculated from the following:    Height as of this encounter: 1.524 m (5').    Weight as of this encounter: 66.7 kg (147 lb).   Weight management plan: Discussed healthy diet and exercise guidelines      See Patient Instructions    Suha Duarte is a 38 year old, presenting for the following health issues:  Abdominal Pain (Follow up from 9/2023 visit for left sided abd pain, pt completed CT scan but has not yet seen GI per referral notes)        4/17/2024     8:52 AM    Additional Questions   Roomed by Gen DANIEL CMA     History of Present Illness       Reason for visit:  Pain in the left stomach    She eats 2-3 servings of fruits and vegetables daily.She consumes 3 sweetened beverage(s) daily.She exercises with enough effort to increase her heart rate 9 or less minutes per day.  She exercises with enough effort to increase her heart rate 3 or less days per week.   She is taking medications regularly.             Review of Systems  Constitutional, HEENT, cardiovascular, pulmonary, gi and gu systems are negative, except as otherwise noted.      Objective    /62 (BP Location: Right arm, Patient Position: Sitting, Cuff Size: Adult Regular)   Pulse 65   Temp 98.3  F (36.8  C) (Oral)   Resp 14   Ht 1.524 m (5')   Wt 66.7 kg (147 lb)   LMP 03/20/2024   SpO2 99%   Breastfeeding No   BMI 28.71 kg/m    Body mass index is 28.71 kg/m .  Physical Exam   GENERAL: alert and no distress  NECK: no adenopathy, no asymmetry, masses, or scars  RESP: lungs clear to auscultation - no rales, rhonchi or wheezes  CV: regular rate and rhythm, normal S1 S2, no S3 or S4, no murmur, click or rub, no peripheral edema  ABDOMEN: soft,   no hepatosplenomegaly, no masses and bowel sounds normal mild tenderness on LUQ, moderate on LLQ  MS: no gross musculoskeletal defects noted, no edema            Signed Electronically by: Yasmine Meléndez MD

## 2024-04-18 ENCOUNTER — PATIENT OUTREACH (OUTPATIENT)
Dept: CARE COORDINATION | Facility: CLINIC | Age: 39
End: 2024-04-18
Payer: COMMERCIAL

## 2024-04-18 ENCOUNTER — TELEPHONE (OUTPATIENT)
Dept: GASTROENTEROLOGY | Facility: CLINIC | Age: 39
End: 2024-04-18
Payer: COMMERCIAL

## 2024-04-18 LAB
ALBUMIN SERPL BCG-MCNC: 5.1 G/DL (ref 3.5–5.2)
ALP SERPL-CCNC: 61 U/L (ref 40–150)
ALT SERPL W P-5'-P-CCNC: 48 U/L (ref 0–50)
ANION GAP SERPL CALCULATED.3IONS-SCNC: 13 MMOL/L (ref 7–15)
AST SERPL W P-5'-P-CCNC: 36 U/L (ref 0–45)
BILIRUB SERPL-MCNC: 0.9 MG/DL
BUN SERPL-MCNC: 6.3 MG/DL (ref 6–20)
CALCIUM SERPL-MCNC: 10.2 MG/DL (ref 8.6–10)
CHLORIDE SERPL-SCNC: 101 MMOL/L (ref 98–107)
CREAT SERPL-MCNC: 0.49 MG/DL (ref 0.51–0.95)
DEPRECATED HCO3 PLAS-SCNC: 24 MMOL/L (ref 22–29)
EGFRCR SERPLBLD CKD-EPI 2021: >90 ML/MIN/1.73M2
GLUCOSE SERPL-MCNC: 104 MG/DL (ref 70–99)
POTASSIUM SERPL-SCNC: 4 MMOL/L (ref 3.4–5.3)
PROT SERPL-MCNC: 8 G/DL (ref 6.4–8.3)
SODIUM SERPL-SCNC: 138 MMOL/L (ref 135–145)

## 2024-04-18 NOTE — TELEPHONE ENCOUNTER
[facility] Call Center    Phone Message    May a detailed message be left on voicemail: Yes    Reason for Call: Other: Patient is currently scheduled on 05/29/2024, as visit type New GI Urgent. This is outside the expected timeline for this referral. Patient has been added to the waitlist.      Action Taken: Message routed to:  Other: GI REFERRAL TRIAGE POOL     Travel Screening: Not Applicable

## 2024-05-02 ENCOUNTER — PATIENT OUTREACH (OUTPATIENT)
Dept: CARE COORDINATION | Facility: CLINIC | Age: 39
End: 2024-05-02
Payer: COMMERCIAL

## 2024-05-29 ENCOUNTER — OFFICE VISIT (OUTPATIENT)
Dept: GASTROENTEROLOGY | Facility: CLINIC | Age: 39
End: 2024-05-29
Attending: FAMILY MEDICINE
Payer: COMMERCIAL

## 2024-05-29 VITALS
SYSTOLIC BLOOD PRESSURE: 120 MMHG | DIASTOLIC BLOOD PRESSURE: 83 MMHG | BODY MASS INDEX: 28.61 KG/M2 | OXYGEN SATURATION: 100 % | HEART RATE: 76 BPM | WEIGHT: 146.5 LBS

## 2024-05-29 DIAGNOSIS — R10.13 ABDOMINAL PAIN, EPIGASTRIC: ICD-10-CM

## 2024-05-29 DIAGNOSIS — R10.32 ABDOMINAL PAIN, LEFT LOWER QUADRANT: ICD-10-CM

## 2024-05-29 DIAGNOSIS — R10.12 ABDOMINAL PAIN, LEFT UPPER QUADRANT: Primary | ICD-10-CM

## 2024-05-29 DIAGNOSIS — K76.0 FATTY LIVER: ICD-10-CM

## 2024-05-29 PROCEDURE — 82784 ASSAY IGA/IGD/IGG/IGM EACH: CPT | Performed by: PHYSICIAN ASSISTANT

## 2024-05-29 PROCEDURE — 99204 OFFICE O/P NEW MOD 45 MIN: CPT | Performed by: PHYSICIAN ASSISTANT

## 2024-05-29 PROCEDURE — 86364 TISS TRNSGLTMNASE EA IG CLAS: CPT | Performed by: PHYSICIAN ASSISTANT

## 2024-05-29 PROCEDURE — 36415 COLL VENOUS BLD VENIPUNCTURE: CPT | Performed by: PHYSICIAN ASSISTANT

## 2024-05-29 ASSESSMENT — PAIN SCALES - GENERAL: PAINLEVEL: SEVERE PAIN (6)

## 2024-05-29 NOTE — NURSING NOTE
Chief Complaint   Patient presents with    New Patient     Left lower abdominal pain.     She requests these members of her care team be copied on today's visit information:  PCP:   Yasmine Meléndez MD  [address] [facility]  [address] [address]    Vitals:    05/29/24 1348   BP: 120/83   BP Location: Left arm   Patient Position: Sitting   Cuff Size: Adult Regular   Pulse: 76   SpO2: 100%   Weight: 66.5 kg (146 lb 8 oz)     Body mass index is 28.61 kg/m .    Medications were reconciled.        Halima Stephen CMA

## 2024-05-29 NOTE — PATIENT INSTRUCTIONS
It was a pleasure taking care of you today.  I've included a brief summary of our discussion and care plan from today's visit below.  Please review this information with your primary care provider.  ______________________________________________________________________    My recommendations are summarized as follows:  --please obtain H.pylori testing and other stool studies. Once you submit sample for H.Pylori testing -- you can start taking omeprazole 20mg daily, 30-60 minutes before your first meal in the morning.   --obtain labs.   --schedule x-ray of the abdomen so we can evaluate your stool burden.   --you can also trial lidocaine patches over the left side of your abdomen.     Lifestyle modifications for gastroesophageal reflux disease (GERD).     1. Change your eating habits.  -- It's best to eat several small meals instead of two or three large meals.  -- After you eat, wait 2 to 3 hours before you lie down. Late-night snacks aren't a good idea.  -- Chocolate, mint, and alcohol can make GERD worse. They relax the valve between the esophagus and the stomach.  -- Spicy foods, fatty foods, foods that have a lot of acid (like tomatoes and oranges), and coffee can make GERD symptoms worse in some people. If your symptoms are worse after you eat a certain food, you may want to stop eating that food to see if your symptoms get better.    2. Do not smoke or chew tobacco.    3. If you have GERD symptoms at night, raise the head of your bed 6 in. (15 cm) to 8 in. (20 cm) by putting the frame on blocks or placing a foam wedge under the head of your mattress. (Adding extra pillows does not work.)    4. Avoid or reduce pressure on your stomach. Don't wear tight clothing around your middle.    5. Lose weight if you need to. Losing just 5 to 10 pounds can help.    --I would continue to follow with your primary care provider for fatty liver.    -- please see scheduling information provided below     Return to GI Clinic in 3  months to review your progress.    ______________________________________________________________________    How do I schedule labs, imaging studies, or procedures that were ordered in clinic today?     Labs: To schedule lab appointment at the [facility], use my chart or call [phone]. If you have a [place] lab closer to home where you are regularly seen you can give them a call.     Procedures: If a colonoscopy, upper endoscopy, breath test, esophageal manometry, or pH impedence was ordered today, our endoscopy team will call you to schedule this. If you have not heard from our endoscopy team within a week, please call [phone] to schedule.     Imaging Studies: If you were scheduled for a CT scan, X-ray, MRI, ultrasound, HIDA scan or other imaging study, please call [phone] to have this scheduled.     Referral: If a referral to another specialty was ordered, expect a phone call or follow instructions above. If you have not heard from anyone regarding your referral in a week, please call our clinic to check the status.     Who do I call with any questions after my visit?  Please be in touch if there are any further questions that arise following today's visit.  There are multiple ways to contact your gastroenterology care team.      During business hours, you may reach a Gastroenterology nurse at [phone]    To schedule or reschedule an appointment, please call [phone].     You can always send a secure message through [employer]It[employer].  HItviews messages are answered by your nurse or doctor typically within 24 hours.  Please allow extra time on weekends and holidays.      For urgent/emergent questions after business hours, you may reach the on-call GI Fellow by contacting the [facility]  at [phone].     How will I get the results of any tests ordered?    You will receive all of your results.  If you have signed up for HIt[employer],  any tests ordered at your visit will be available to you after your provider reviews them.  Typically this takes 1-2 weeks.  If there are urgent results that require a change in your care plan, your provider or nurse will call you to discuss the next steps.      What is [employer]Bankhart?  [employer] is a secure way for you to access all of your healthcare records from the [facility].  It is a web based computer program, so you can sign on to it from any location.  It also allows you to send secure messages to your care team.  I recommend signing up for [employer] access if you have not already done so and are comfortable with using a computer.      How to I schedule a follow-up visit?  If you did not schedule a follow-up visit today, please call [phone] to schedule a follow-up office visit.      Sincerely,    Zeina Linda PA-C  Division of Gastroenterology, Hepatology & Nutrition  [facility]

## 2024-05-29 NOTE — PROGRESS NOTES
GI CLINIC VISIT    CC/REFERRING MD:  Yasmine Meléndez      ASSESSMENT/PLAN:  39 y/o F presents for evaluation of abdominal pain.     #abdominal pain - LUQ, epigastric, LLQ  #?constipation  #?abdominal wall pain  Patient reports that she has had ongoing left-sided abdominal pain for the last 4 years that seems to have become worse over the last year and a half or so.  She reports she has pain daily the left side of her abdomen, feels that it is a burning sensation that worsens with eating.  She does report some mild improvement with bowel movement.  She also reports that her bowel pattern can vary in consistency, describing a [place] type II to type V stool.  I did review imaging she had in the past, she did have a CT of her abdomen and pelvis in September 2023 which showed evidence of cholelithiasis as well as hepatic steatosis.  On exam she does have a tenderness in the epigastric area, left upper quadrant and left lower quadrant of her abdomen.  Does have a positive Carnett's sign in LLQ of abdomen. .  Differential diagnosis is quite broad including constipation, gastritis, GERD, abdominal wall pain, irritable bowel syndrome, etc.  Plan to obtain H. pylori testing given left upper quadrant abdominal pain, also obtain celiac serologies and a fecal calprotectin for further evaluation.  Also obtain an abdominal x-ray to evaluate for constipation.  Recommended that she trial a 8-week course of omeprazole 20 mg daily to see if this helps with her symptoms.  We did discuss GERD lifestyle modifications.  X-ray is concerning for constipation we will plan to treat this with MiraLAX and fiber.  Given positive Carnett's sign, will also have her trial lidoderm patches over the LLQ of the abdomen.  At this time do not feel endoscopy is warranted.  -- Obtain labs and stool studies.  -- Trial omeprazole 20 mg daily, 30 to 60 minutes before first meal the morning after stool studies have been submitted.  -- Obtain abdominal x-ray to  evaluate stool burden.  -- Can try Lidoderm 4% patches over the left lower quadrant of the abdomen.  -- Future considerations: If above workup is unrevealing could potentially consider scopes but we will let these would likely be low yield.    #hepatic steatosis  Patient has evidence of hepatic steatosis, currently following with her primary care provider.  FIB 4 Calculation: 0.67 at 4/17/2024  9:24 AM  Calculated from:  SGOT/AST: 36 U/L at 4/17/2024  9:24 AM  SGPT/ALT: 48 U/L at 4/17/2024  9:24 AM  Platelets: 310 10e3/uL at 4/17/2024  9:24 AM  Age: 38 years    - Recommend FIB-4 yearly with PCP.   - Recommend slow gradual weight loss. - Maintain good control of cholesterol  - Optimize blood glucose as needed.   - Improve nutrition: continue limiting carbohydrates, limit amount of red/processed meat consumed, especially simple carbohydrates, and place emphasis on following a Mediterranean diet  - Increase physical activity: attempt to increase by >60 minutes/week, maintain physical activity as able        Colorectal cancer screening: due end of 2028    RTC 3 months    Thank you for this consultation.  It was a pleasure to participate in the care of this patient; please contact us with any further questions.       45 minutes spent on the date of the encounter doing chart review, review of outside records, review of test results, patient visit, and documentation    This note was created with voice recognition software, and while reviewed for accuracy, typos may remain.    Zeina Linda PA-C  Division of Gastroenterology, Hepatology and Nutrition  [facility]  37 y/o F presents for evaluation of abdominal pain.     Patient reports she developed LLQ abdominal pain at least 4 years ago -- feels though that symptoms are worsening over the last 1.5 years. She reports she has pain daily in the LLQ of her abdomen, pain is constant, described as a burning sensation. She  "feels that pain is better when she does not eat, feels pain is worse with eating, can also improve with taking tylenol and ibuprofen. Denies nausea or vomiting. Reports abdominal bloating as well, that has been ongoing with this pain. Feels bloating is worse after eating. She notes pain is improved with having a bowel movement. She does endorse heartburn symptoms during pregnancies, but denies any reflux symptoms. Denies odynphagia or dysphagia.     Patient will generally have a bowel movement daily or every other day, stool consistency can vary between a brist[place] type 2-5. She can often have multiple bowel movements daily. Denies BRBPR. Reports can have intermittent pushing/straining to have a BM. Denies liquid stools. States that her bowel pattern has been like this for many years.     Patient had a RUQ us in 2023 which showed evidence of cholelithiasis and a stone in the galbladder neck as well as hepatic steatosis. She had a CT abd/pelvis in 2023 which again showed cholelithiasis and hepatic steatosis.    Takes NSAIDs or Tylenol a few times a month. Denies use of OTC herbal supplements/weight loss products. Has been taking some sort of \"supplement\" -  barley grass, feels that this helps her to have a BM daily.     Denies use of ETOH and tobacco products. No recreational drug use.     No family history of GI related malignancy (esophageal, gastric, pancreatic, liver or colon) or family history of IBD/celiac disease.     ROS:    No fevers or chills  No weight loss  No shortness of breath or wheezing  No chest pain or pressure  No odynophagia or dysphagia  No BRBPR, hematochezia, melena  No anxiety or depression    PROBLEM LIST  Patient Active Problem List    Diagnosis Date Noted    Breech birth 2022     Priority: Medium    Previous  delivery, antepartum condition or complication 2022     Priority: Medium    Diet controlled gestational diabetes mellitus (GDM) 2022     " Priority: Medium     delivery delivered 2020     Priority: Medium       PERTINENT PAST MEDICAL HISTORY:    Past Medical History:   Diagnosis Date    Depressive disorder        PREVIOUS SURGERIES:    Past Surgical History:   Procedure Laterality Date     SECTION N/A 3/20/2020    Procedure:  SECTION;  Surgeon: Jens To MD;  Location: [facility];  Service: Obstetrics     SECTION N/A 2022    Procedure: REPEAT  SECTION;  Surgeon: Jens To MD;  Location: [facility]       PREVIOUS ENDOSCOPY:  none    ALLERGIES:   No Known Allergies    PERTINENT MEDICATIONS:    Current Outpatient Medications:     multivit-min/iron/folic/blh277 (HAIR, SKIN AND NAILS ADVANCED ORAL), [MULTIVIT-MIN/IRON/FOLIC/QQN911 (HAIR, SKIN AND NAILS ADVANCED ORAL)] Take by mouth., Disp: , Rfl:     SOCIAL HISTORY:    Social History     Socioeconomic History    Marital status:      Spouse name: Not on file    Number of children: Not on file    Years of education: Not on file    Highest education level: Not on file   Occupational History    Not on file   Tobacco Use    Smoking status: Never     Passive exposure: Never    Smokeless tobacco: Never   Substance and Sexual Activity    Alcohol use: Not Currently    Drug use: Never    Sexual activity: Yes     Partners: Male     Birth control/protection: None   Other Topics Concern    Not on file   Social History Narrative    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 [place],   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 as an  at   Lives with her son Eddie Monique    Pilar Troncoso MD 10/16/2020 9:19 AM         Social Determinants of Health     Financial Resource Strain: High Risk (2022)    Received from [facility], [facility]    Financial Resource Strain     Difficulty of Paying Living Expenses: Not on file      Difficulty of Paying Living Expenses: Not on file   Food Insecurity: Not on file   Transportation Needs: Not on file   Physical Activity: Not on file   Stress: Not on file   Social Connections: Unknown (1/1/2022)    Received from [facility], [facility]    Social Connections     Frequency of Communication with Friends and Family: Not on file   Interpersonal Safety: Low Risk  (4/17/2024)    Interpersonal Safety     Do you feel physically and emotionally safe where you currently live?: Yes     Within the past 12 months, have you been hit, slapped, kicked or otherwise physically hurt by someone?: No     Within the past 12 months, have you been humiliated or emotionally abused in other ways by your partner or ex-partner?: No   Housing Stability: Not on file       FAMILY HISTORY:  FH of CRC: none  FH of IBD: none  No family history on file.    Past/family/social history reviewed and no changes    PHYSICAL EXAMINATION:  Constitutional: aaox3, cooperative, pleasant, not dyspneic/diaphoretic, no acute distress  Vitals reviewed: /83 (BP Location: Left arm, Patient Position: Sitting, Cuff Size: Adult Regular)   Pulse 76   Wt 66.5 kg (146 lb 8 oz)   LMP 03/20/2024   SpO2 100%   BMI 28.61 kg/m    Wt:   Wt Readings from Last 2 Encounters:   04/17/24 66.7 kg (147 lb)   09/13/23 64.4 kg (142 lb)      Eyes: Sclera anicteric/injected  Respiratory: Unlabored breathing  Abd: soft,  Nondistended, tender to palpation over epigastric, LUQ of abdomen and LLQ of abdomen. +carnett's sign in LLQ of abdomen.  Skin: warm, perfused, no jaundice  Psych: Normal affect  MSK: Normal gait

## 2024-05-29 NOTE — LETTER
5/29/2024         RE: Felicia Mcallister  [address]  [facility] [address]        Dear Colleague,    Thank you for referring your patient, Felicia Mcallister, to the [facility]. Please see a copy of my visit note below.    GI CLINIC VISIT    CC/REFERRING MD:  Yasmine Meléndez      ASSESSMENT/PLAN:  37 y/o F presents for evaluation of abdominal pain.     #abdominal pain - LUQ, epigastric, LLQ  #?constipation  #?abdominal wall pain  Patient reports that she has had ongoing left-sided abdominal pain for the last 4 years that seems to have become worse over the last year and a half or so.  She reports she has pain daily the left side of her abdomen, feels that it is a burning sensation that worsens with eating.  She does report some mild improvement with bowel movement.  She also reports that her bowel pattern can vary in consistency, describing a [place] type II to type V stool.  I did review imaging she had in the past, she did have a CT of her abdomen and pelvis in September 2023 which showed evidence of cholelithiasis as well as hepatic steatosis.  On exam she does have a tenderness in the epigastric area, left upper quadrant and left lower quadrant of her abdomen.  Does have a positive Carnett's sign in LLQ of abdomen. .  Differential diagnosis is quite broad including constipation, gastritis, GERD, abdominal wall pain, irritable bowel syndrome, etc.  Plan to obtain H. pylori testing given left upper quadrant abdominal pain, also obtain celiac serologies and a fecal calprotectin for further evaluation.  Also obtain an abdominal x-ray to evaluate for constipation.  Recommended that she trial a 8-week course of omeprazole 20 mg daily to see if this helps with her symptoms.  We did discuss GERD lifestyle modifications.  X-ray is concerning for constipation we will plan to treat this with MiraLAX and fiber.  Given positive Carnett's sign, will also have her trial lidoderm patches over  the LLQ of the abdomen.  At this time do not feel endoscopy is warranted.  -- Obtain labs and stool studies.  -- Trial omeprazole 20 mg daily, 30 to 60 minutes before first meal the morning after stool studies have been submitted.  -- Obtain abdominal x-ray to evaluate stool burden.  -- Can try Lidoderm 4% patches over the left lower quadrant of the abdomen.  -- Future considerations: If above workup is unrevealing could potentially consider scopes but we will let these would likely be low yield.    #hepatic steatosis  Patient has evidence of hepatic steatosis, currently following with her primary care provider.  FIB 4 Calculation: 0.67 at 4/17/2024  9:24 AM  Calculated from:  SGOT/AST: 36 U/L at 4/17/2024  9:24 AM  SGPT/ALT: 48 U/L at 4/17/2024  9:24 AM  Platelets: 310 10e3/uL at 4/17/2024  9:24 AM  Age: 38 years    - Recommend FIB-4 yearly with PCP.   - Recommend slow gradual weight loss. - Maintain good control of cholesterol  - Optimize blood glucose as needed.   - Improve nutrition: continue limiting carbohydrates, limit amount of red/processed meat consumed, especially simple carbohydrates, and place emphasis on following a Mediterranean diet  - Increase physical activity: attempt to increase by >60 minutes/week, maintain physical activity as able        Colorectal cancer screening: due end of 2028    RTC 3 months    Thank you for this consultation.  It was a pleasure to participate in the care of this patient; please contact us with any further questions.       45 minutes spent on the date of the encounter doing chart review, review of outside records, review of test results, patient visit, and documentation    This note was created with voice recognition software, and while reviewed for accuracy, typos may remain.    Zeina Linda PA-C  Division of Gastroenterology, Hepatology and Nutrition  [facility]  39 y/o F presents for evaluation of abdominal pain.  "    Patient reports she developed LLQ abdominal pain at least 4 years ago -- feels though that symptoms are worsening over the last 1.5 years. She reports she has pain daily in the LLQ of her abdomen, pain is constant, described as a burning sensation. She feels that pain is better when she does not eat, feels pain is worse with eating, can also improve with taking tylenol and ibuprofen. Denies nausea or vomiting. Reports abdominal bloating as well, that has been ongoing with this pain. Feels bloating is worse after eating. She notes pain is improved with having a bowel movement. She does endorse heartburn symptoms during pregnancies, but denies any reflux symptoms. Denies odynphagia or dysphagia.     Patient will generally have a bowel movement daily or every other day, stool consistency can vary between a bristol type 2-5. She can often have multiple bowel movements daily. Denies BRBPR. Reports can have intermittent pushing/straining to have a BM. Denies liquid stools. States that her bowel pattern has been like this for many years.     Patient had a RUQ us in June 2023 which showed evidence of cholelithiasis and a stone in the galbladder neck as well as hepatic steatosis. She had a CT abd/pelvis in September 2023 which again showed cholelithiasis and hepatic steatosis.    Takes NSAIDs or Tylenol a few times a month. Denies use of OTC herbal supplements/weight loss products. Has been taking some sort of \"supplement\" -  barley grass, feels that this helps her to have a BM daily.     Denies use of ETOH and tobacco products. No recreational drug use.     No family history of GI related malignancy (esophageal, gastric, pancreatic, liver or colon) or family history of IBD/celiac disease.     ROS:    No fevers or chills  No weight loss  No shortness of breath or wheezing  No chest pain or pressure  No odynophagia or dysphagia  No BRBPR, hematochezia, melena  No anxiety or depression    PROBLEM LIST  Patient Active " Problem List    Diagnosis Date Noted     Breech birth 2022     Priority: Medium     Previous  delivery, antepartum condition or complication 2022     Priority: Medium     Diet controlled gestational diabetes mellitus (GDM) 2022     Priority: Medium      delivery delivered 2020     Priority: Medium       PERTINENT PAST MEDICAL HISTORY:    Past Medical History:   Diagnosis Date     Depressive disorder        PREVIOUS SURGERIES:    Past Surgical History:   Procedure Laterality Date      SECTION N/A 3/20/2020    Procedure:  SECTION;  Surgeon: Jens To MD;  Location: [facility] OR;  Service: Obstetrics      SECTION N/A 2022    Procedure: REPEAT  SECTION;  Surgeon: Jens To MD;  Location: [facility] OR       PREVIOUS ENDOSCOPY:  none    ALLERGIES:   No Known Allergies    PERTINENT MEDICATIONS:    Current Outpatient Medications:      multivit-min/iron/folic/geh979 (HAIR, SKIN AND NAILS ADVANCED ORAL), [MULTIVIT-MIN/IRON/FOLIC/BYL753 (HAIR, SKIN AND NAILS ADVANCED ORAL)] Take by mouth., Disp: , Rfl:     SOCIAL HISTORY:    Social History     Socioeconomic History     Marital status:      Spouse name: Not on file     Number of children: Not on file     Years of education: Not on file     Highest education level: Not on file   Occupational History     Not on file   Tobacco Use     Smoking status: Never     Passive exposure: Never     Smokeless tobacco: Never   Substance and Sexual Activity     Alcohol use: Not Currently     Drug use: Never     Sexual activity: Yes     Partners: Male     Birth control/protection: None   Other Topics Concern     Not on file   Social History Narrative    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[place],   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 as an  at   Lives with her son Eddie Monique    Pilar Troncoso MD 10/16/2020 9:19 AM         Social  Determinants of Health     Financial Resource Strain: High Risk (1/1/2022)    [facility]    Financial Resource Strain      Difficulty of Paying Living Expenses: Not on file      Difficulty of Paying Living Expenses: Not on file   Food Insecurity: Not on file   Transportation Needs: Not on file   Physical Activity: Not on file   Stress: Not on file   Social Connections: Unknown (1/1/2022)    [facility]    Social Connections      Frequency of Communication with Friends and Family: Not on file   Interpersonal Safety: Low Risk  (4/17/2024)    Interpersonal Safety      Do you feel physically and emotionally safe where you currently live?: Yes      Within the past 12 months, have you been hit, slapped, kicked or otherwise physically hurt by someone?: No      Within the past 12 months, have you been humiliated or emotionally abused in other ways by your partner or ex-partner?: No   Housing Stability: Not on file       FAMILY HISTORY:  FH of CRC: none  FH of IBD: none  No family history on file.    Past/family/social history reviewed and no changes    PHYSICAL EXAMINATION:  Constitutional: aaox3, cooperative, pleasant, not dyspneic/diaphoretic, no acute distress  Vitals reviewed: /83 (BP Location: Left arm, Patient Position: Sitting, Cuff Size: Adult Regular)   Pulse 76   Wt 66.5 kg (146 lb 8 oz)   LMP 03/20/2024   SpO2 100%   BMI 28.61 kg/m    Wt:   Wt Readings from Last 2 Encounters:   04/17/24 66.7 kg (147 lb)   09/13/23 64.4 kg (142 lb)      Eyes: Sclera anicteric/injected  Respiratory: Unlabored breathing  Abd: soft,  Nondistended, tender to palpation over epigastric, LUQ of abdomen and LLQ of abdomen. +carnett's sign in LLQ of abdomen.  Skin: warm, perfused, no jaundice  Psych: Normal affect  MSK: Normal  gait            Again, thank you for allowing me to participate in the care of your patient.        Sincerely,        Zeina Linda PA-C

## 2024-05-30 LAB — IGA SERPL-MCNC: 225 MG/DL (ref 84–499)

## 2024-05-31 LAB
TTG IGA SER-ACNC: 0.3 U/ML
TTG IGG SER-ACNC: <0.6 U/ML

## 2024-06-03 ENCOUNTER — HOSPITAL ENCOUNTER (OUTPATIENT)
Dept: RADIOLOGY | Facility: CLINIC | Age: 39
Discharge: HOME | End: 2024-06-03
Attending: PHYSICIAN ASSISTANT
Payer: COMMERCIAL

## 2024-06-03 ENCOUNTER — APPOINTMENT (OUTPATIENT)
Dept: LAB | Facility: CLINIC | Age: 39
End: 2024-06-03
Attending: PHYSICIAN ASSISTANT
Payer: COMMERCIAL

## 2024-06-03 DIAGNOSIS — R10.12 ABDOMINAL PAIN, LEFT UPPER QUADRANT: ICD-10-CM

## 2024-06-03 DIAGNOSIS — R10.13 ABDOMINAL PAIN, EPIGASTRIC: ICD-10-CM

## 2024-06-03 DIAGNOSIS — R10.32 ABDOMINAL PAIN, LEFT LOWER QUADRANT: ICD-10-CM

## 2024-06-03 PROCEDURE — 87338 HPYLORI STOOL AG IA: CPT | Performed by: PHYSICIAN ASSISTANT

## 2024-06-03 PROCEDURE — 83993 ASSAY FOR CALPROTECTIN FECAL: CPT | Performed by: PHYSICIAN ASSISTANT

## 2024-06-03 PROCEDURE — 74019 RADEX ABDOMEN 2 VIEWS: CPT

## 2024-06-04 DIAGNOSIS — A04.8 H. PYLORI INFECTION: Primary | ICD-10-CM

## 2024-06-04 LAB — H PYLORI AG STL QL IA: POSITIVE

## 2024-06-05 LAB — CALPROTECTIN STL-MCNT: 12.8 MG/KG (ref 0–49.9)

## 2024-06-17 ENCOUNTER — TELEPHONE (OUTPATIENT)
Dept: ONCOLOGY | Facility: CLINIC | Age: 39
End: 2024-06-17
Payer: COMMERCIAL

## 2024-06-17 ENCOUNTER — VIRTUAL VISIT (OUTPATIENT)
Dept: GASTROENTEROLOGY | Facility: CLINIC | Age: 39
End: 2024-06-17
Attending: PHYSICIAN ASSISTANT
Payer: COMMERCIAL

## 2024-06-17 DIAGNOSIS — A04.8 H. PYLORI INFECTION: Primary | ICD-10-CM

## 2024-06-17 RX ORDER — METRONIDAZOLE 250 MG/1
250 TABLET ORAL 4 TIMES DAILY
Qty: 56 TABLET | Refills: 0 | Status: SHIPPED | OUTPATIENT
Start: 2024-06-17 | End: 2024-07-01

## 2024-06-17 RX ORDER — TETRACYCLINE HYDROCHLORIDE 500 MG/1
500 CAPSULE ORAL 4 TIMES DAILY
Qty: 56 CAPSULE | Refills: 0 | Status: SHIPPED | OUTPATIENT
Start: 2024-06-17 | End: 2024-07-01

## 2024-06-17 RX ORDER — BISMUTH SUBSALICYLATE 262 MG/1
2 TABLET, CHEWABLE ORAL 4 TIMES DAILY
Qty: 112 TABLET | Refills: 0 | Status: SHIPPED | OUTPATIENT
Start: 2024-06-17 | End: 2024-07-01

## 2024-06-17 NOTE — Clinical Note
6/17/2024      Felicia Mcallister  [address] [facility] [address]      Dear Colleague,    Thank you for referring your patient, Felicia Mcallister, to the [facility]. Please see a copy of my visit note below.    Medication Therapy Management (MTM) Encounter    ASSESSMENT:                            Medication Adherence/Access: {adherencechoices:839693}    H. Pylori:    ***      PLAN:                            I recommend starting treatment with bismuth quadruple therapy for 14 days:  - Bismuth subsalicylate 262 mg tablets Take 2 tablets (524 mg) by mouth 4 times daily  - Tetracycline 500 mg Take 1 capsule by mouth 4 times daily  - Metronidazole 250 mg Take 1 tablet by mouth 4 times daily  - Omeprazole 20 mg Take 1 capsule by mouth 2 times daily     Do not take any multivitamins (hair, skin, nails supplement) or dairy while on these antibiotics as they can make the tetracycline less effective.     You will be due for stool testing to make sure the H. Pylori infection is gone no sooner than 4 weeks after completion of your antibiotics.   - You must be off *** for at least 1-2 weeks prior to eradication testing.  - May use Over The Counter Pepcid (famotidine) for breakthrough heartburn symptoms but must be off for at least 24 hours prior to test    Use caution while in the sun, cover skin, limit time, or use SPF >30   - Continue until off medications for 3 days     Four times per day doses should be taken after morning meal, after midday meal, after evening meal, and at bedtime  - *** should be taken twice daily, best 30 minutes prior to morning and evening meal, but okay to take with other medications after meal if you forget    Contact Gastroenterology clinic if having any allergic or severe side effects  - Phone number [phone] or [employer] message       Call Kerri if any issues getting medications or for any medication questions  - Phone number [phone], may also  send a [employer] message      Follow-up: 7 day check in, ***    SUBJECTIVE/OBJECTIVE:                          Felicia Mcallister is a 38 year old female seen for an initial visit. She was referred to me from Zeina Linda PA-C. {[facility]:224342}     Reason for visit: H. Pylori treatment    Allergies/ADRs: {1/2/3/4/5:534663}  Past Medical History: {1/2/3/4/5:145760}  Tobacco: She reports that she has never smoked. She has never been exposed to tobacco smoke. She has never used smokeless tobacco.  Alcohol: {ALCOHOL CONSUMPTION HX:306789}    Medication Adherence/Access: [facility]672678}    H. Pylori:  - Bismuth Quad:   - Tetracycline 500 mg : 1 cap 4 times daily   - Metronidazole 250 m tab 4 times daily  - Pepto Bismol 262 mg chew tab: 2 tabs (524 mg) 4 times daily   - {ppihyplori:414265}  Duration of Therapy:  14 days   Would like orders sent to:  ***    Feels *** understands each medication, it's role in therapy, side effects, and agrees to call if any questions or adverse effects experienced.  Confirms no current or previous liver or kidney issues.     Diagnosis by stool antigen on 6/3/24  Previous treatment regimens: none  Previous Macrolide exposure: none   Penicillin Allergy: none   Pregnant/breastfeeding: ***   KIDNEY: No known chronic kidney disease  LIVER: No known chronic liver disease  Drug-drug interactions: No dairy, multivitamins or antacids     Lab Results   Component Value Date    GFRESTIMATED >90 2024    GFRESTBLACK >60 2020    AST 36 2024    ALT 48 2024    ALKPHOS 61 2024    BILITOTAL 0.9 2024       Drug-Disease and Drug-Drug Interactions:    Bismuth- gout, diabetes, bleeding, ulcers, renal insufficiency    Tetracycline/Doxycycline- pregnancy, infancy, childhood up to 8 years; severe liver disease, severe renal disease; intraacranial hypertension; myasthenia gravis; drug interactions    Metronidazole- severe kidney impairment, class C hepatic impairment,  "active neurologic disorders, hypothyroidism, hypoadrenalism    Levofloxacin - Older adult: Adverse effects (eg, hepatotoxicity, tendon rupture, QT changes, aortic dissection) may be increased in the elderly, renal impairment (increased seizure risk), history of peripheral neuropathy; avoid use, psychiatric concerns, and CNS concerns.    Clarithromycin: Hepatotoxicity (eg, increased liver enzymes, and hepatocellular and/or cholestatic hepatitis, with or without jaundice), Increased risk of cardiovascular problems or death in patients with heart disease, QT prolongation, arrhythmia, and cases of torsades de pointes, including fatalities, have been reported; increased risk in elderly patients, severe renal impairment     ***    ----------------      I spent {mt total time 3:639253} with this patient today. { :684556}. A copy of the visit note was provided to the patient's provider(s).    A summary of these recommendations {GIVEN/NOT GIVEN:939384}.    ***    Telemedicine Visit Details  Type of service:  {[facility]::\"[facility] visit\"}  Start Time: {video/phone visit start time:152948}  End Time: {video/phone visit end time:152948}     Medication Therapy Recommendations  No medication therapy recommendations to display       Medication Therapy Management (MTM) Encounter    ASSESSMENT:                            Medication Adherence/Access: No issues identified    H. Pylori:  Felicia would benefit from first-line therapy for treatment of H. Pylori. Given no previous treatment or allergies, I recommend Bismuth Quad. No adjustments for renal or hepatic function required. She will hold multivitamins due to drug-drug interaction. She will be due for eradication testing no sooner than 4 weeks after completion of her antibiotics. She should not take PPIs for at least 1-2 weeks prior to eradication testing.    PLAN:                            I recommend starting treatment with bismuth quadruple therapy for 14 " days:  - Bismuth subsalicylate 262 mg tablets Take 2 tablets (524 mg) by mouth 4 times daily  - Tetracycline 500 mg Take 1 capsule by mouth 4 times daily  - Metronidazole 250 mg Take 1 tablet by mouth 4 times daily  - Omeprazole 20 mg Take 1 capsule by mouth 2 times daily     Do not take any multivitamins (hair, skin, nails supplement) or dairy while on these antibiotics as they can make the tetracycline less effective.     Do not take any ibuprofen, naproxen or aspirin as these can worsen stomach symptoms.  Acetaminophen is okay to take if needed.     You will be due for stool testing to make sure the H. Pylori infection is gone no sooner than 4 weeks after completion of your antibiotics.   - You must be off omeprazole for at least 1-2 weeks prior to eradication testing.  - May use Over The Counter Pepcid (famotidine) for breakthrough heartburn symptoms but must be off for at least 24 hours prior to test    Use caution while in the sun, cover skin, limit time, or use SPF >30   - Continue until off medications for 3 days     Four times per day doses should be taken after morning meal, after midday meal, after evening meal, and at bedtime  - omeprazole should be taken twice daily, best 30 minutes prior to morning and evening meal, but okay to take with other medications after meal if you forget    Contact Gastroenterology clinic if having any allergic or severe side effects  - Phone number [phone] or [employer] message       Call Kerri if any issues getting medications or for any medication questions  - Phone number [phone], may also send a [employer] message    Follow-up: 7 day check in, 6/24/24 at 11 am     SUBJECTIVE/OBJECTIVE:                          Felicia Mcallister is a 38 year old female seen for an initial visit. She was referred to me from Zeina Linda PA-C.      Reason for visit: H. Pylori treatment    Allergies/ADRs: Reviewed in chart  Past Medical History: Reviewed in chart  Tobacco: She reports that  she has never smoked. She has never been exposed to tobacco smoke. She has never used smokeless tobacco.  Alcohol: not currently using    Medication Adherence/Access: no issues reported    H. Pylori:  - omeprazole 20 mg cap: 1 once daily, started 6/3/24, will s  - Bismuth Quad:   - Tetracycline 500 mg : 1 cap 4 times daily   - Metronidazole 250 m tab 4 times daily  - Pepto Bismol 262 mg chew tab: 2 tabs (524 mg) 4 times daily   - Omeprazole 20 mg twice daily  Duration of Therapy:  14 days   Would like orders sent to:  [facility]    Reports having burning on left side of stomach but not painful, is tolerable.  Does have this daily all day, some foods make the burning worse.  Has taken Tylenol and Advil, feels these did help.  Has tried omeprazole OTC, burning did not improve.  Also tried Tums, caused burping and did not help.  Feels she understands each medication, it's role in therapy, side effects, and agrees to call if any questions or adverse effects experienced.  Confirms no current or previous liver or kidney issues.     Diagnosis by stool antigen on 6/3/24  Previous treatment regimens: none  Previous Macrolide exposure: none   Penicillin Allergy: none   Pregnant/breastfeeding: no   KIDNEY: No known chronic kidney disease  LIVER: No known chronic liver disease  Drug-drug interactions: No dairy, multivitamins or antacids     Lab Results   Component Value Date    GFRESTIMATED >90 2024    GFRESTBLACK >60 2020    AST 36 2024    ALT 48 2024    ALKPHOS 61 2024    BILITOTAL 0.9 2024     ----------------    I spent 45 minutes with this patient today. All changes were made via collaborative practice agreement with Zeina Linda. A copy of the visit note was provided to the patient's provider(s).    A summary of these recommendations was sent via [employer].    Kerri Kiran, PharmD    Medication Therapy Management Pharmacist  [facility]    [phone]    Telemedicine Visit Details  Type of service:  Telephone visit  Start Time:  11:00 am  End Time: 11:45 AM     Medication Therapy Recommendations  No medication therapy recommendations to display         Again, thank you for allowing me to participate in the care of your patient.        Sincerely,        Kerri Kiran [facility]

## 2024-06-17 NOTE — PATIENT INSTRUCTIONS
Recommendations from today's MTM visit:                                                      I recommend starting treatment with bismuth quadruple therapy for 14 days:  - Bismuth subsalicylate 262 mg tablets Take 2 tablets (524 mg) by mouth 4 times daily  - Tetracycline 500 mg Take 1 capsule by mouth 4 times daily  - Metronidazole 250 mg Take 1 tablet by mouth 4 times daily  - Omeprazole 20 mg Take 1 capsule by mouth 2 times daily     Do not take any multivitamins (hair, skin, nails supplement) or dairy while on these antibiotics as they can make the tetracycline less effective.     Do not take any ibuprofen, naproxen or aspirin as these can worsen stomach symptoms.  Acetaminophen is okay to take if needed.     You will be due for stool testing to make sure the H. Pylori infection is gone no sooner than 4 weeks after completion of your antibiotics.   - You must be off omeprazole for at least 1-2 weeks prior to eradication testing.  - May use Over The Counter Pepcid (famotidine) for breakthrough heartburn symptoms but must be off for at least 24 hours prior to test    Use caution while in the sun, cover skin, limit time, or use SPF >30   - Continue until off medications for 3 days     Four times per day doses should be taken after morning meal, after midday meal, after evening meal, and at bedtime  - omeprazole should be taken twice daily, best 30 minutes prior to morning and evening meal, but okay to take with other medications after meal if you forget    Contact Gastroenterology clinic if having any allergic or severe side effects  - Phone number [phone] or [employer] message       Call Kerri if any issues getting medications or for any medication questions  - Phone number [phone], may also send a [employer] message    Follow-up: 7 day check in, 6/24/24 at 11 am     It was great speaking with you today.  I value your experience and would be very thankful for your time in providing feedback in our clinic  "survey. In the next few days, you may receive an email or text message from [facility] [email or web address] with a link to a survey related to your  clinical pharmacist.\"     To schedule another MTM appointment, please call the clinic directly or you may call the MTM scheduling line at [phone] or toll-free at [phone].     My Clinical Pharmacist's contact information:                                                      Please feel free to contact me with any questions or concerns you have.      Kerri Kiran, PharmD    Medication Therapy Management Pharmacist  [facility]   [phone]   "

## 2024-06-17 NOTE — PROGRESS NOTES
Medication Therapy Management (MTM) Encounter    ASSESSMENT:                            Medication Adherence/Access: No issues identified    H. Pylori:  Felicia would benefit from first-line therapy for treatment of H. Pylori. Given no previous treatment or allergies, I recommend Bismuth Quad. No adjustments for renal or hepatic function required. She will hold multivitamins due to drug-drug interaction. She will be due for eradication testing no sooner than 4 weeks after completion of her antibiotics. She should not take PPIs for at least 1-2 weeks prior to eradication testing.    PLAN:                            I recommend starting treatment with bismuth quadruple therapy for 14 days:  - Bismuth subsalicylate 262 mg tablets Take 2 tablets (524 mg) by mouth 4 times daily  - Tetracycline 500 mg Take 1 capsule by mouth 4 times daily  - Metronidazole 250 mg Take 1 tablet by mouth 4 times daily  - Omeprazole 20 mg Take 1 capsule by mouth 2 times daily     Do not take any multivitamins (hair, skin, nails supplement) or dairy while on these antibiotics as they can make the tetracycline less effective.     Do not take any ibuprofen, naproxen or aspirin as these can worsen stomach symptoms.  Acetaminophen is okay to take if needed.     You will be due for stool testing to make sure the H. Pylori infection is gone no sooner than 4 weeks after completion of your antibiotics.   - You must be off omeprazole for at least 1-2 weeks prior to eradication testing.  - May use Over The Counter Pepcid (famotidine) for breakthrough heartburn symptoms but must be off for at least 24 hours prior to test    Use caution while in the sun, cover skin, limit time, or use SPF >30   - Continue until off medications for 3 days     Four times per day doses should be taken after morning meal, after midday meal, after evening meal, and at bedtime  - omeprazole should be taken twice daily, best 30 minutes prior to morning and evening meal, but okay  to take with other medications after meal if you forget    Contact Gastroenterology clinic if having any allergic or severe side effects  - Phone number [phone] or [employer] message       Call Kerri if any issues getting medications or for any medication questions  - Phone number [phone], may also send a [employer] message    Follow-up: 7 day check in, 24 at 11 am     SUBJECTIVE/OBJECTIVE:                          Felicia Mcallister is a 38 year old female seen for an initial visit. She was referred to me from Zeina Linda PA-C.      Reason for visit: H. Pylori treatment    Allergies/ADRs: Reviewed in chart  Past Medical History: Reviewed in chart  Tobacco: She reports that she has never smoked. She has never been exposed to tobacco smoke. She has never used smokeless tobacco.  Alcohol: not currently using    Medication Adherence/Access: no issues reported    H. Pylori:  - Omeprazole 20 mg cap: 1 once daily, started 6/3/24  - Bismuth Quad:   - Tetracycline 500 mg : 1 cap 4 times daily   - Metronidazole 250 m tab 4 times daily  - Pepto Bismol 262 mg chew tab: 2 tabs (524 mg) 4 times daily   - Omeprazole 20 mg twice daily  Duration of Therapy:  14 days   Would like orders sent to:  [facility]    Reports having burning on left side of stomach but not painful, is tolerable.  Does have this daily all day, some foods make the burning worse.  Has taken Tylenol and Advil, feels these did help.  Has tried omeprazole OTC, burning did not improve.  Also tried Tums, caused burping and did not help.  Feels she understands each medication, it's role in therapy, side effects, and agrees to call if any questions or adverse effects experienced.  Confirms no current or previous liver or kidney issues.     Diagnosis by stool antigen on 6/3/24  Previous treatment regimens: none  Previous Macrolide exposure: none   Penicillin Allergy: none   Pregnant/breastfeeding: no   KIDNEY: No known chronic kidney  disease  LIVER: No known chronic liver disease  Drug-drug interactions: No dairy, multivitamins or antacids     Lab Results   Component Value Date    GFRESTIMATED >90 04/17/2024    GFRESTBLACK >60 02/06/2020    AST 36 04/17/2024    ALT 48 04/17/2024    ALKPHOS 61 04/17/2024    BILITOTAL 0.9 04/17/2024     ----------------    I spent 45 minutes with this patient today. All changes were made via collaborative practice agreement with Zeina Linda. A copy of the visit note was provided to the patient's provider(s).    A summary of these recommendations was sent via [employer].    Kerri Kiran, PharmD    Medication Therapy Management Pharmacist  [facility]enter[facility]   [phone]    Telemedicine Visit Details  Type of service:  Telephone visit  Start Time:  11:00 am  End Time: 11:45 AM     Medication Therapy Recommendations  H. pylori infection    Rationale: Untreated condition - Needs additional medication therapy - Indication   Recommendation: Start Medication - Bismuth/Metronidaz/Tetracyclin 140-125-125 MG Caps   Status: Accepted per CPA   Note: Recommend Bismuth Quad

## 2024-06-18 NOTE — TELEPHONE ENCOUNTER
Coverage for OTC meds was discussed during our visit and she is aware she may need to pay out of pocket.    Kerri Kiran, PharmD    Medication Therapy Management Pharmacist  [facility]astroenter[facility]   [phone]      
PRIOR AUTHORIZATION DENIED    Medication: PEPTO-BISMOL 262 MG PO CHEW  Insurance Company: [employer] [place] - Phone [phone] Fax [phone]  Denial Date: 6/17/2024  Denial Reason(s): OTCs are not covered          
Retail Pharmacy Prior Authorization Team   Phone: [phone]    PA Initiation    Medication: PEPTO-BISMOL 262 MG PO CHEW  Insurance Company: [facility] - Phone [phone] Fax [phone]  Pharmacy Filling the Rx: [id number] [place], [place] - [address] AT [facility] [address]  Filling Pharmacy Phone: [phone]  Filling Pharmacy Fax: [phone]  Start Date: 6/17/2024          
13-Dec-2018 21:12

## 2024-06-24 ENCOUNTER — VIRTUAL VISIT (OUTPATIENT)
Dept: GASTROENTEROLOGY | Facility: CLINIC | Age: 39
End: 2024-06-24
Attending: PHYSICIAN ASSISTANT
Payer: COMMERCIAL

## 2024-06-24 DIAGNOSIS — A04.8 H. PYLORI INFECTION: Primary | ICD-10-CM

## 2024-06-24 NOTE — PROGRESS NOTES
Medication Therapy Management (MTM) Encounter    ASSESSMENT:                            Medication Adherence/Access: No issues identified    H. Pylori:  Felicia would benefit from completing Bismuth Quad therapy. She will hold multivitamins due to drug-drug interaction. She will be due for eradication testing 7/30/24, no sooner than 4 weeks after completion of her antibiotics. She should not take PPIs for at least 1-2 weeks prior to eradication testing. Will send [employer] message reminder to complete eradication testing on 7/30/24.      PLAN:                            I recommend completing treatment with bismuth quadruple therapy for 14 days:  - Bismuth subsalicylate 262 mg tablets Take 2 tablets (524 mg) by mouth 4 times daily  - Tetracycline 500 mg Take 1 capsule by mouth 4 times daily  - Metronidazole 250 mg Take 1 tablet by mouth 4 times daily  - Omeprazole 20 mg Take 1 capsule by mouth 2 times daily     Do not take any multivitamins (hair, skin, nails supplement) or dairy while on these antibiotics as they can make the tetracycline less effective.     Do not take any ibuprofen, naproxen or aspirin as these can worsen stomach symptoms.  Acetaminophen is okay to take if needed.     Order for H. Pylori eradication test kit has been placed   - You can  from any [facility] lab at your convenience but do not test until on or after 7/30/24    You will be due for stool testing to make sure the H. Pylori infection is gone no sooner than 4 weeks after completion of your antibiotics.   - You must be off omeprazole for at least 1-2 weeks prior to eradication testing.  - May use Over The Counter Pepcid (famotidine) for breakthrough heartburn symptoms but must be off for at least 24 hours prior to test    Use caution while in the sun, cover skin, limit time, or use SPF >30   - Continue until off medications for 3 days     Four times per day doses should be taken after morning meal, after midday meal, after  evening meal, and at bedtime  - omeprazole should be taken twice daily, best 30 minutes prior to morning and evening meal, but okay to take with other medications after meal if you forget    Contact Gastroenterology clinic if having any allergic or severe side effects  - Phone number [phone] or [employer] message       Call Kerri if any medication questions  - Phone number [phone], may also send a [employer] message    Follow-up: MyChart message reminder to complete eradication testing 24     SUBJECTIVE/OBJECTIVE:                          Felicia Mcallister is a 38 year old female seen for an initial visit. She was referred to me from Zeina Linda PA-C.      Reason for visit: H. Pylori treatment    Allergies/ADRs: Reviewed in chart  Past Medical History: Reviewed in chart  Tobacco: She reports that she has never smoked. She has never been exposed to tobacco smoke. She has never used smokeless tobacco.  Alcohol: not currently using    Medication Adherence/Access: no issues reported  Reports no missed doses. Confirms taking omeprazole prior to meals around 9 am and 9 pm. Four times daily doses are at 9 am, 1 pm, 5 pm, and 9 pm.    H. Pylori:  - Omeprazole 20 mg cap: 1 once daily, will hold until testing completed  - Bismuth Quad:   - Tetracycline 500 mg : 1 cap 4 times daily   - Metronidazole 250 m tab 4 times daily  - Pepto Bismol 262 mg chew tab: 2 tabs (524 mg) 4 times daily   - Omeprazole 20 mg twice daily  Duration of Therapy:  14 days   [facility] started:   evening     Compete:     Soonest Test: 24     Is having some weakness, tiredness, and nausea. Reports burning in stomach has improved. Having mild constipation which did not have prior to treatment, also having dark stool. Confirms understanding on picking up and completing stool antigen kit.     ----------------    I spent 15 minutes with this patient today. All changes were made via  collaborative practice agreement with Zeina Linda. A copy of the visit note was provided to the patient's provider(s).    A summary of these recommendations was sent via [employer].    Kerri Kiran, PharmD    Medication Therapy Management Pharmacist  [facility] Gastroenterology   [phone]      Telemedicine Visit Details  Type of service:  Telephone visit  Start Time:  11:01 am  End Time: 11:16 AM     Medication Therapy Recommendations  No medication therapy recommendations to display

## 2024-06-24 NOTE — PATIENT INSTRUCTIONS
Recommendations from today's [facility] visit:                                                      I recommend completing treatment with bismuth quadruple therapy for 14 days:  - Bismuth subsalicylate 262 mg tablets Take 2 tablets (524 mg) by mouth 4 times daily  - Tetracycline 500 mg Take 1 capsule by mouth 4 times daily  - Metronidazole 250 mg Take 1 tablet by mouth 4 times daily  - Omeprazole 20 mg Take 1 capsule by mouth 2 times daily     Do not take any multivitamins (hair, skin, nails supplement) or dairy while on these antibiotics as they can make the tetracycline less effective.     Do not take any ibuprofen, naproxen or aspirin as these can worsen stomach symptoms.  Acetaminophen is okay to take if needed.     Order for H. Pylori eradication test kit has been placed   - You can  from any [facility] lab at your convenience but do not test until on or after 7/30/24    You will be due for stool testing to make sure the H. Pylori infection is gone no sooner than 4 weeks after completion of your antibiotics.   - You must be off omeprazole for at least 1-2 weeks prior to eradication testing.  - May use Over The Counter Pepcid (famotidine) for breakthrough heartburn symptoms but must be off for at least 24 hours prior to test    Use caution while in the sun, cover skin, limit time, or use SPF >30   - Continue until off medications for 3 days     Four times per day doses should be taken after morning meal, after midday meal, after evening meal, and at bedtime  - omeprazole should be taken twice daily, best 30 minutes prior to morning and evening meal, but okay to take with other medications after meal if you forget    Contact Gastroenterology clinic if having any allergic or severe side effects  - Phone number [phone] or [employer] message       Call Kerri if any medication questions  - Phone number [phone], may also send a [employer] message    Follow-up: [employer] message reminder to complete  "eradication testing 7/30/24     It was great speaking with you today.  I value your experience and would be very thankful for your time in providing feedback in our clinic survey. In the next few days, you may receive an email or text message from [facility] [employer] with a link to a survey related to your  clinical pharmacist.\"     To schedule another MTM appointment, please call the clinic directly or you may call the MTM scheduling line at [phone] or toll-free at [phone].     My Clinical Pharmacist's contact information:                                                      Please feel free to contact me with any questions or concerns you have.      Kerri Kiran, PharmD    Medication Therapy Management Pharmacist  [facility] Gastroenter[facility]   [phone]   "

## 2024-06-24 NOTE — Clinical Note
6/24/2024      Felicia Mcallister  [address] [place] [address]      Dear Colleague,    Thank you for referring your patient, Felicia Mcallister, to the [facility]. Please see a copy of my visit note below.    Medication Therapy Management (MTM) Encounter    ASSESSMENT:                            Medication Adherence/Access: No issues identified    H. Pylori:  Felicia would benefit from completing Bismuth Quad therapy. She will hold multivitamins due to drug-drug interaction. She will be due for eradication testing ***, no sooner than 4 weeks after completion of her antibiotics. She should not take PPIs for at least 1-2 weeks prior to eradication testing. Will send [employer] message reminder to complete eradication testing on ***.      PLAN:                            I recommend completing treatment with bismuth quadruple therapy for 14 days:  - Bismuth subsalicylate 262 mg tablets Take 2 tablets (524 mg) by mouth 4 times daily  - Tetracycline 500 mg Take 1 capsule by mouth 4 times daily  - Metronidazole 250 mg Take 1 tablet by mouth 4 times daily  - Omeprazole 20 mg Take 1 capsule by mouth 2 times daily     Do not take any multivitamins (hair, skin, nails supplement) or dairy while on these antibiotics as they can make the tetracycline less effective.     Do not take any ibuprofen, naproxen or aspirin as these can worsen stomach symptoms.  Acetaminophen is okay to take if needed.     Order for H. Pylori eradication test kit has been placed - You can  from any [facility] lab at your convenience but do not test until on or after ***    You will be due for stool testing to make sure the H. Pylori infection is gone no sooner than 4 weeks after completion of your antibiotics.   - You must be off omeprazole for at least 1-2 weeks prior to eradication testing.  - May use Over The Counter Pepcid (famotidine) for breakthrough heartburn symptoms but must be off for at  least 24 hours prior to test    Use caution while in the sun, cover skin, limit time, or use SPF >30   - Continue until off medications for 3 days     Four times per day doses should be taken after morning meal, after midday meal, after evening meal, and at bedtime  - omeprazole should be taken twice daily, best 30 minutes prior to morning and evening meal, but okay to take with other medications after meal if you forget    Contact Gastroenterology clinic if having any allergic or severe side effects  - Phone number [phone] or [employer] message       Call Kerri if any issues getting medications or for any medication questions  - Phone number [phone], may also send a [employer] message    Follow-up: [employer] message reminder to complete eradication testing ***     SUBJECTIVE/OBJECTIVE:                          Felicia Mcallister is a 38 year old female seen for an initial visit. She was referred to me from Zeina Linda PA-C.      Reason for visit: H. Pylori treatment    Allergies/ADRs: Reviewed in chart  Past Medical History: Reviewed in chart  Tobacco: She reports that she has never smoked. She has never been exposed to tobacco smoke. She has never used smokeless tobacco.  Alcohol: not currently using    Medication Adherence/Access: no issues reported  Reports *** missed doses.     H. Pylori:  - Omeprazole 20 mg cap: 1 once daily, started 6/3/24 ***  - Bismuth Quad:   - Tetracycline 500 mg : 1 cap 4 times daily   - Metronidazole 250 m tab 4 times daily  - Pepto Bismol 262 mg chew tab: 2 tabs (524 mg) 4 times daily   - Omeprazole 20 mg twice daily  Duration of Therapy:  14 days   [facility] started:  *** Compete: ***  Soonest Test: ***     ***    Reports having burning on left side of stomach but not painful, is tolerable.  Does have this daily all day, some foods make the burning worse.  Has taken Tylenol and Advil, feels these did help.  Has tried omeprazole  "OTC, burning did not improve.  Also tried Tums, caused burping and did not help.  Feels she understands each medication, it's role in therapy, side effects, and agrees to call if any questions or adverse effects experienced.  Confirms no current or previous liver or kidney issues.     Diagnosis by stool antigen on 6/3/24  Previous treatment regimens: none  Previous Macrolide exposure: none   Penicillin Allergy: none   Pregnant/breastfeeding: no   KIDNEY: No known chronic kidney disease  LIVER: No known chronic liver disease  Drug-drug interactions: No dairy, multivitamins or antacids     Lab Results   Component Value Date    GFRESTIMATED >90 04/17/2024    GFRESTBLACK >60 02/06/2020    AST 36 04/17/2024    ALT 48 04/17/2024    ALKPHOS 61 04/17/2024    BILITOTAL 0.9 04/17/2024       ----------------    I spent {mtm total time 3:789514} with this patient today. { :532843}. A copy of the visit note was provided to the patient's provider(s).    A summary of these recommendations {GIVEN/NOT GIVEN:415633}.    ***    Telemedicine Visit Details  Type of service:  {telemedvisitmtm:922501::\"Telephone visit\"}  Start Time: {video/phone visit start time:152948}  End Time: {video/phone visit end time:152948}     Medication Therapy Recommendations  No medication therapy recommendations to display        Medication Therapy Management (MTM) Encounter    ASSESSMENT:                            Medication Adherence/Access: No issues identified    H. Pylori:  Felicia would benefit from completing Bismuth Quad therapy. She will hold multivitamins due to drug-drug interaction. She will be due for eradication testing 7/30/24, no sooner than 4 weeks after completion of her antibiotics. She should not take PPIs for at least 1-2 weeks prior to eradication testing. Will send [employer] message reminder to complete eradication testing on 7/30/24.      PLAN:                            I recommend completing treatment with bismuth quadruple therapy " for 14 days:  - Bismuth subsalicylate 262 mg tablets Take 2 tablets (524 mg) by mouth 4 times daily  - Tetracycline 500 mg Take 1 capsule by mouth 4 times daily  - Metronidazole 250 mg Take 1 tablet by mouth 4 times daily  - Omeprazole 20 mg Take 1 capsule by mouth 2 times daily     Do not take any multivitamins (hair, skin, nails supplement) or dairy while on these antibiotics as they can make the tetracycline less effective.     Do not take any ibuprofen, naproxen or aspirin as these can worsen stomach symptoms.  Acetaminophen is okay to take if needed.     Order for H. Pylori eradication test kit has been placed   - You can  from any [facility] lab at your convenience but do not test until on or after 7/30/24    You will be due for stool testing to make sure the H. Pylori infection is gone no sooner than 4 weeks after completion of your antibiotics.   - You must be off omeprazole for at least 1-2 weeks prior to eradication testing.  - May use Over The Counter Pepcid (famotidine) for breakthrough heartburn symptoms but must be off for at least 24 hours prior to test    Use caution while in the sun, cover skin, limit time, or use SPF >30   - Continue until off medications for 3 days     Four times per day doses should be taken after morning meal, after midday meal, after evening meal, and at bedtime  - omeprazole should be taken twice daily, best 30 minutes prior to morning and evening meal, but okay to take with other medications after meal if you forget    Contact Gastroenterology clinic if having any allergic or severe side effects  - Phone number [phone] or [employer] message       Call Kerri if any issues getting medications or for any medication questions  - Phone number [phone], may also send a [employer] message    Follow-up: [employer] message reminder to complete eradication testing 7/30/24     SUBJECTIVE/OBJECTIVE:                          Felicia Mcallister is a 38 year old female seen for  an initial visit. She was referred to me from Zeina Linda PA-C.      Reason for visit: H. Pylori treatment    Allergies/ADRs: Reviewed in chart  Past Medical History: Reviewed in chart  Tobacco: She reports that she has never smoked. She has never been exposed to tobacco smoke. She has never used smokeless tobacco.  Alcohol: not currently using    Medication Adherence/Access: no issues reported  Reports no missed doses. Confirms taking omeprazole prior to meals around 9 am and 9 pm. Four times daily doses are at 9 am, 1 pm, 5 pm, and 9 pm.    H. Pylori:  - Omeprazole 20 mg cap: 1 once daily, will hold until testing completed  - Bismuth Quad:   - Tetracycline 500 mg : 1 cap 4 times daily   - Metronidazole 250 m tab 4 times daily  - Pepto Bismol 262 mg chew tab: 2 tabs (524 mg) 4 times daily   - Omeprazole 20 mg twice daily  Duration of Therapy:  14 days   Would like orders sent to:  [facility] started:   evening     Compete:     Soonest Test: 24     Is having some weakness, tiredness, and nausea. Reports burning in stomach has improved. Having mild constipation which did not have prior to treatment, also having dark stool. Confirms understanding on picking up and completing stool antigen kit.     ----------------    I spent 15 minutes with this patient today. All changes were made via collaborative practice agreement with Zeina Linda. A copy of the visit note was provided to the patient's provider(s).    A summary of these recommendations was sent via [employer].    Kerri Kiran, PharmD    Medication Therapy Management Pharmacist  [facility] Gastroenterology   [phone]      Telemedicine Visit Details  Type of service:  Telephone visit  Start Time:  11:01 am  End Time: 11:16 AM     Medication Therapy Recommendations  No medication therapy recommendations to display          Again, thank you for allowing me to participate in the care of your patient.         Sincerely,        Kerri Kiran RPH

## 2024-07-30 ENCOUNTER — LAB (OUTPATIENT)
Dept: LAB | Facility: CLINIC | Age: 39
End: 2024-07-30
Payer: COMMERCIAL

## 2024-07-30 DIAGNOSIS — A04.8 H. PYLORI INFECTION: ICD-10-CM

## 2024-07-30 PROCEDURE — 87338 HPYLORI STOOL AG IA: CPT

## 2024-07-31 LAB — H PYLORI AG STL QL IA: NEGATIVE

## 2024-08-30 ENCOUNTER — PATIENT OUTREACH (OUTPATIENT)
Dept: CARE COORDINATION | Facility: CLINIC | Age: 39
End: 2024-08-30
Payer: COMMERCIAL

## 2024-09-05 ENCOUNTER — OFFICE VISIT (OUTPATIENT)
Dept: GASTROENTEROLOGY | Facility: CLINIC | Age: 39
End: 2024-09-05
Attending: PHYSICIAN ASSISTANT
Payer: COMMERCIAL

## 2024-09-05 VITALS
BODY MASS INDEX: 28.33 KG/M2 | SYSTOLIC BLOOD PRESSURE: 126 MMHG | DIASTOLIC BLOOD PRESSURE: 82 MMHG | OXYGEN SATURATION: 100 % | WEIGHT: 144.3 LBS | HEIGHT: 60 IN | HEART RATE: 66 BPM

## 2024-09-05 DIAGNOSIS — R10.12 ABDOMINAL PAIN, LEFT UPPER QUADRANT: Primary | ICD-10-CM

## 2024-09-05 DIAGNOSIS — R10.32 ABDOMINAL PAIN, LEFT LOWER QUADRANT: ICD-10-CM

## 2024-09-05 PROCEDURE — 99215 OFFICE O/P EST HI 40 MIN: CPT | Performed by: PHYSICIAN ASSISTANT

## 2024-09-05 ASSESSMENT — PAIN SCALES - GENERAL: PAINLEVEL: MODERATE PAIN (5)

## 2024-09-05 NOTE — LETTER
9/5/2024      Felicia Mcallister  [address] [facility] [address]      Dear Colleague,    Thank you for referring your patient, Felicia Mcallister, to the [facility]STRO[facility] [place]. Please see a copy of my visit note below.    GI CLINIC VISIT    CC/REFERRING MD:  Yasmine Meléndez      ASSESSMENT/PLAN:  37 y/o F presents for evaluation of abdominal pain x at least 4 years    #LUQ abd pain  #LLQ abd pain  #bloat  Given relative stability and chronicity, sx are suggestive of a hyper-visceral sensitivity/functional pain picture. Retained stools may also contribute to this pain. DDX includes pelvic floor dysfunction, MSK pain, SIBO, and thought less likely - intraluminal process (CTAP W contrast unremarkable 9/2023, no anemia, weight stable, no sig FHX) vs underlying inflammatory condition (fecal calprotectin unremarkable, cross-sectional imaging normal, clinical history not congruent with classic IBD) vs other.     -Pain did not improve after treatment of H. Pylori  -She is not interested in abdominal wall PT  -She declined dietary consult  -She did use NSAIDs chronically, so possible some SB disease? Eval with CTE.     -She is requesting a colonoscopy for direct visualization of the colon. We reviewed that this would likely be low yield for evaluation of her pain. We discussed R/B, complications, alternative management. Per patient preference, order colonoscopy. She will decide if she ultimately wishes to proceed with lower scope.     Future consideration  -trial of neuromodulator     #H. Pylori  Status post bismuth quad therapy with confirmed eradication late July 2024.  No further workup indicated    #hepatic steatosis  As seen on CT scan 9/2023 and RUQ US 6/2023.    Recent fib 4 score of 0.67 putting her at low risk for having advanced fibrosis.  This is likely driven by metabolic disease. Denies ETOH.   Cont follow up with primary     - Recommend FIB-4 yearly with PCP.   - Recommend  slow gradual weight loss. - Maintain good control of cholesterol  - Optimize blood glucose as needed.   - Improve nutrition: continue limiting carbohydrates, limit amount of red/processed meat consumed, especially simple carbohydrates, and place emphasis on following a Mediterranean diet  - Increase physical activity: attempt to increase by >60 minutes/week, maintain physical activity as able     Colorectal cancer screening: due end of 2028    [facility] 3 months    Thank you for this consultation.  It was a pleasure to participate in the care of this patient; please contact us with any further questions.     40 minutes spent on the date of the encounter doing chart review, review of outside records, review of test results, patient visit, and documentation    Henny Zambrano, RENETTA      HPI  37 y/o F presents for evaluation of abdominal pain.     Zeina Linda note 5/29/24  Patient reports she developed LLQ abdominal pain at least 4 years ago -- feels though that symptoms are worsening over the last 1.5 years. She reports she has pain daily in the LLQ of her abdomen, pain is constant, described as a burning sensation. She feels that pain is better when she does not eat, feels pain is worse with eating, can also improve with taking tylenol and ibuprofen. Denies nausea or vomiting. Reports abdominal bloating as well, that has been ongoing with this pain. Feels bloating is worse after eating. She notes pain is improved with having a bowel movement. She does endorse heartburn symptoms during pregnancies, but denies any reflux symptoms. Denies odynphagia or dysphagia.     Patient will generally have a bowel movement daily or every other day, stool consistency can vary between a bristol type 2-5. She can often have multiple bowel movements daily. Denies BRBPR. Reports can have intermittent pushing/straining to have a BM. Denies liquid stools. States that her bowel pattern has been like this for many years.     Patient had a RUQ us in June  "2023 which showed evidence of cholelithiasis and a stone in the galbladder neck as well as hepatic steatosis. She had a CT abd/pelvis in September 2023 which again showed cholelithiasis and hepatic steatosis.    Takes NSAIDs or Tylenol a few times a month. Denies use of OTC herbal supplements/weight loss products. Has been taking some sort of \"supplement\" -  barley grass, feels that this helps her to have a BM daily.     Denies use of ETOH and tobacco products. No recreational drug use.     No family history of GI related malignancy (esophageal, gastric, pancreatic, liver or colon) or family history of IBD/celiac disease.     Today 9/5/24  LLQ, LUQ abd pain - continues w/o any improvement after ABX tx for h.pylori.   Burning sensation, non-radiating. Worse with eating, better with otc pain meds. Pain begins 0.5-1 hour after eating, hitting severity all at once. Once it is present, it tends to last all day. Positional changes will not make this pain worse. She is avoiding APAP use now.   She feels this pain may be related to retained stools.   Avoiding eating helps.   No associated f/c/n/v/change in stooling, bloody stools, black stools.     Stooling - having 1 stool every 1-2 days, dark brown / green coloration - dark colored stools occur very intermittently. She feels this follows her diet. Started 1 mo ago. Stools are loose but not sticky. Denies black stool. Soft serve consistency is her normal.    She is not on iron or pepto bismol.     She had a baby 2 years ago. Does also have  a 4 yr   2 c-sections, no known tears     No ETOH use. No NSAID - though shares she used to take NSAIDs very regularly.   No weight loss. Prepregnancy weight 120#, today at 144#    Wt Readings from Last 10 Encounters:   09/05/24 65.5 kg (144 lb 4.8 oz)   05/29/24 66.5 kg (146 lb 8 oz)   04/17/24 66.7 kg (147 lb)   09/13/23 64.4 kg (142 lb)   06/29/23 60.3 kg (133 lb)   06/16/23 63.4 kg (139 lb 12.8 oz)   05/18/23 62.6 kg (138 lb) "   09/15/22 67.1 kg (148 lb)   22 77.1 kg (170 lb)   22 74.4 kg (164 lb)       PROBLEM LIST  Patient Active Problem List    Diagnosis Date Noted     Breech birth 2022     Priority: Medium     Previous  delivery, antepartum condition or complication 2022     Priority: Medium     Diet controlled gestational diabetes mellitus (GDM) 2022     Priority: Medium      delivery delivered 2020     Priority: Medium       PERTINENT PAST MEDICAL HISTORY:    Past Medical History:   Diagnosis Date     Depressive disorder        PREVIOUS SURGERIES:    Past Surgical History:   Procedure Laterality Date      SECTION N/A 3/20/2020    Procedure:  SECTION;  Surgeon: Jens To MD;  Location: [facility] L[facility]D OR;  Service: Obstetrics      SECTION N/A 2022    Procedure: REPEAT  SECTION;  Surgeon: Jens To MD;  Location: [facility] L D OR       PREVIOUS ENDOSCOPY:  none    ALLERGIES:   No Known Allergies    PERTINENT MEDICATIONS:    Current Outpatient Medications:      multivit-min/iron/folic/sdl009 (HAIR, SKIN AND NAILS ADVANCED ORAL), [MULTIVIT-MIN/IRON/FOLIC/GLY715 (HAIR, SKIN AND NAILS ADVANCED ORAL)] Take by mouth., Disp: , Rfl:      omeprazole (PRILOSEC) 20 MG DR capsule, Take 1 capsule (20 mg) by mouth daily for 120 days (Patient not taking: Reported on 2024), Disp: 30 capsule, Rfl: 3     Prenatal Vit-Fe Fumarate-FA (PRENATAL ONE DAILY PO), Take 1 capsule by mouth daily (Patient not taking: Reported on 2024), Disp: , Rfl:     SOCIAL HISTORY:    Social History     Socioeconomic History     Marital status:      Spouse name: Not on file     Number of children: Not on file     Years of education: Not on file     Highest education level: Not on file   Occupational History     Not on file   Tobacco Use     Smoking status: Never     Passive exposure: Never     Smokeless tobacco: Never   Vaping Use     Vaping status:  Never Used   Substance and Sexual Activity     Alcohol use: Not Currently     Drug use: Never     Sexual activity: Yes     Partners: Male     Birth control/protection: None   Other Topics Concern     Not on file   Social History Narrative    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[place],   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 as an  at   Lives with her son Eddie Monique    Pilar Troncoso MD 10/16/2020 9:19 AM         Social Determinants of Health     Financial Resource Strain: High Risk (1/1/2022)    [facility] [facility]    Financial Resource Strain      Difficulty of Paying Living Expenses: Not on file      Difficulty of Paying Living Expenses: Not on file   Food Insecurity: Not on file   Transportation Needs: Not on file   Physical Activity: Not on file   Stress: Not on file   Social Connections: Unknown (1/1/2022)    Received [facility] [employer]    Social Connections      Frequency of Communication with Friends and Family: Not on file   Interpersonal Safety: Low Risk  (4/17/2024)    Interpersonal Safety      Do you feel physically and emotionally safe where you currently live?: Yes      Within the past 12 months, have you been hit, slapped, kicked or otherwise physically hurt by someone?: No      Within the past 12 months, have you been humiliated or emotionally abused in other ways by your partner or ex-partner?: No   Housing Stability: Not on file       FAMILY HISTORY:  FH of CRC: none  FH of IBD: none  No family history on file.    Past/family/social history reviewed and no changes    PHYSICAL EXAMINATION:  Constitutional: aaox3, cooperative, pleasant, not dyspneic/diaphoretic, no acute distress  Vitals reviewed: /82   Pulse 66   Ht 1.524 m (5')   Wt 65.5 kg (144 lb 4.8 oz)   SpO2 100%   BMI 28.18 kg/m     Wt:   Wt Readings from Last 2 Encounters:   09/05/24 65.5 kg (144 lb 4.8 oz)   05/29/24 66.5 kg (146 lb 8 oz)      Eyes: Sclera anicteric/injected  Respiratory: Unlabored breathing  Abd: soft,  Nondistended, hyper-tympanic noted to L side of her abdomen. Some tenderness noted to LUQ,LLQ.   Skin: warm, perfused, no jaundice  Psych: Normal affect  MSK: Normal gait            Again, thank you for allowing me to participate in the care of your patient.        Sincerely,        Henny Zambrano PA-C

## 2024-09-05 NOTE — PROGRESS NOTES
GI CLINIC VISIT    CC/REFERRING MD:  Yasmine Meléndez      ASSESSMENT/PLAN:  37 y/o F presents for evaluation of abdominal pain x at least 4 years    #LUQ abd pain  #LLQ abd pain  #bloat  Given relative stability and chronicity, sx are suggestive of a hyper-visceral sensitivity/functional pain picture. Retained stools may also contribute to this pain. DDX includes pelvic floor dysfunction, MSK pain, SIBO, and thought less likely - intraluminal process (CTAP W contrast unremarkable 9/2023, no anemia, weight stable, no sig FHX) vs underlying inflammatory condition (fecal calprotectin unremarkable, cross-sectional imaging normal, clinical history not congruent with classic IBD) vs other.     -Pain did not improve after treatment of H. Pylori  -She is not interested in abdominal wall PT  -She declined dietary consult  -She did use NSAIDs chronically, so possible some SB disease? Eval with CTE.     -She is requesting a colonoscopy for direct visualization of the colon. We reviewed that this would likely be low yield for evaluation of her pain. We discussed R/B, complications, alternative management. Per patient preference, order colonoscopy. She will decide if she ultimately wishes to proceed with lower scope.     Future consideration  -trial of neuromodulator     #H. Pylori  Status post bismuth quad therapy with confirmed eradication late July 2024.  No further workup indicated    #hepatic steatosis  As seen on CT scan 9/2023 and RUQ US 6/2023.    Recent fib 4 score of 0.67 putting her at low risk for having advanced fibrosis.  This is likely driven by metabolic disease. Denies ETOH.   Cont follow up with primary     - Recommend FIB-4 yearly with PCP.   - Recommend slow gradual weight loss. - Maintain good control of cholesterol  - Optimize blood glucose as needed.   - Improve nutrition: continue limiting carbohydrates, limit amount of red/processed meat consumed, especially simple carbohydrates, and place emphasis on  following a Mediterranean diet  - Increase physical activity: attempt to increase by >60 minutes/week, maintain physical activity as able     Colorectal cancer screening: due end of 2028    [facility] 3 months    Thank you for this consultation.  It was a pleasure to participate in the care of this patient; please contact us with any further questions.     40 minutes spent on the date of the encounter doing chart review, review of outside records, review of test results, patient visit, and documentation    Henny Juan Manuel, PAC      HPI  37 y/o F presents for evaluation of abdominal pain.     Zeina Linda note 5/29/24  Patient reports she developed LLQ abdominal pain at least 4 years ago -- feels though that symptoms are worsening over the last 1.5 years. She reports she has pain daily in the LLQ of her abdomen, pain is constant, described as a burning sensation. She feels that pain is better when she does not eat, feels pain is worse with eating, can also improve with taking tylenol and ibuprofen. Denies nausea or vomiting. Reports abdominal bloating as well, that has been ongoing with this pain. Feels bloating is worse after eating. She notes pain is improved with having a bowel movement. She does endorse heartburn symptoms during pregnancies, but denies any reflux symptoms. Denies odynphagia or dysphagia.     Patient will generally have a bowel movement daily or every other day, stool consistency can vary between a bristol type 2-5. She can often have multiple bowel movements daily. Denies BRBPR. Reports can have intermittent pushing/straining to have a BM. Denies liquid stools. States that her bowel pattern has been like this for many years.     Patient had a RUQ us in June 2023 which showed evidence of cholelithiasis and a stone in the galbladder neck as well as hepatic steatosis. She had a CT abd/pelvis in September 2023 which again showed cholelithiasis and hepatic steatosis.    Takes NSAIDs or Tylenol a few times a month.  "Denies use of OTC herbal supplements/weight loss products. Has been taking some sort of \"supplement\" -  barley grass, feels that this helps her to have a BM daily.     Denies use of ETOH and tobacco products. No recreational drug use.     No family history of GI related malignancy (esophageal, gastric, pancreatic, liver or colon) or family history of IBD/celiac disease.     Today 24  LLQ, LUQ abd pain - continues w/o any improvement after ABX tx for h.pylori.   Burning sensation, non-radiating. Worse with eating, better with otc pain meds. Pain begins 0.5-1 hour after eating, hitting severity all at once. Once it is present, it tends to last all day. Positional changes will not make this pain worse. She is avoiding APAP use now.   She feels this pain may be related to retained stools.   Avoiding eating helps.   No associated f/c/n/v/change in stooling, bloody stools, black stools.     Stooling - having 1 stool every 1-2 days, dark brown / green coloration - dark colored stools occur very intermittently. She feels this follows her diet. Started 1 mo ago. Stools are loose but not sticky. Denies black stool. Soft serve consistency is her normal.    She is not on iron or pepto bismol.     She had a baby 2 years ago. Does also have  a 4 yr   2 c-sections, no known tears     No ETOH use. No NSAID - though shares she used to take NSAIDs very regularly.   No weight loss. Prepregnancy weight 120#, today at 144#    Wt Readings from Last 10 Encounters:   24 65.5 kg (144 lb 4.8 oz)   24 66.5 kg (146 lb 8 oz)   24 66.7 kg (147 lb)   23 64.4 kg (142 lb)   23 60.3 kg (133 lb)   23 63.4 kg (139 lb 12.8 oz)   23 62.6 kg (138 lb)   09/15/22 67.1 kg (148 lb)   22 77.1 kg (170 lb)   22 74.4 kg (164 lb)       PROBLEM LIST  Patient Active Problem List    Diagnosis Date Noted    Breech birth 2022     Priority: Medium    Previous  delivery, antepartum condition or " complication 2022     Priority: Medium    Diet controlled gestational diabetes mellitus (GDM) 2022     Priority: Medium     delivery delivered 2020     Priority: Medium       PERTINENT PAST MEDICAL HISTORY:    Past Medical History:   Diagnosis Date    Depressive disorder        PREVIOUS SURGERIES:    Past Surgical History:   Procedure Laterality Date     SECTION N/A 3/20/2020    Procedure:  SECTION;  Surgeon: Jens To MD;  Location: [facility] L[facility]D OR;  Service: Obstetrics     SECTION N/A 2022    Procedure: REPEAT  SECTION;  Surgeon: Jens To MD;  Location: [facility] L D OR       PREVIOUS ENDOSCOPY:  none    ALLERGIES:   No Known Allergies    PERTINENT MEDICATIONS:    Current Outpatient Medications:     multivit-min/iron/folic/agd692 (HAIR, SKIN AND NAILS ADVANCED ORAL), [MULTIVIT-MIN/IRON/FOLIC/VKC506 (HAIR, SKIN AND NAILS ADVANCED ORAL)] Take by mouth., Disp: , Rfl:     omeprazole (PRILOSEC) 20 MG DR capsule, Take 1 capsule (20 mg) by mouth daily for 120 days (Patient not taking: Reported on 2024), Disp: 30 capsule, Rfl: 3    Prenatal Vit-Fe Fumarate-FA (PRENATAL ONE DAILY PO), Take 1 capsule by mouth daily (Patient not taking: Reported on 2024), Disp: , Rfl:     SOCIAL HISTORY:    Social History     Socioeconomic History    Marital status:      Spouse name: Not on file    Number of children: Not on file    Years of education: Not on file    Highest education level: Not on file   Occupational History    Not on file   Tobacco Use    Smoking status: Never     Passive exposure: Never    Smokeless tobacco: Never   Vaping Use    Vaping status: Never Used   Substance and Sexual Activity    Alcohol use: Not Currently    Drug use: Never    Sexual activity: Yes     Partners: Male     Birth control/protection: None   Other Topics Concern    Not on file   Social History Narrative    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[place],   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 as an  at   Lives with her son Eddie Monique    Pilar Troncoso MD 10/16/2020 9:19 AM         Social Determinants of Health     Financial Resource Strain: High Risk (1/1/2022)    [facility]    Financial Resource Strain     Difficulty of Paying Living Expenses: Not on file     Difficulty of Paying Living Expenses: Not on file   Food Insecurity: Not on file   Transportation Needs: Not on file   Physical Activity: Not on file   Stress: Not on file   Social Connections: Unknown (1/1/2022)    Received from [facility], [facility]    Social Connections     Frequency of Communication with Friends and Family: Not on file   Interpersonal Safety: Low Risk  (4/17/2024)    Interpersonal Safety     Do you feel physically and emotionally safe where you currently live?: Yes     Within the past 12 months, have you been hit, slapped, kicked or otherwise physically hurt by someone?: No     Within the past 12 months, have you been humiliated or emotionally abused in other ways by your partner or ex-partner?: No   Housing Stability: Not on file       FAMILY HISTORY:  FH of CRC: none  FH of IBD: none  No family history on file.    Past/family/social history reviewed and no changes    PHYSICAL EXAMINATION:  Constitutional: aaox3, cooperative, pleasant, not dyspneic/diaphoretic, no acute distress  Vitals reviewed: /82   Pulse 66   Ht 1.524 m (5')   Wt 65.5 kg (144 lb 4.8 oz)   SpO2 100%   BMI 28.18 kg/m    Wt:   Wt Readings from Last 2 Encounters:   09/05/24 65.5 kg (144 lb 4.8 oz)   05/29/24 66.5 kg (146 lb 8 oz)      Eyes: Sclera anicteric/injected  Respiratory: Unlabored breathing  Abd: soft,  Nondistended, hyper-tympanic noted to L side of her abdomen. Some tenderness noted  to LUQ,LLQ.   Skin: warm, perfused, no jaundice  Psych: Normal affect  MSK: Normal gait

## 2024-09-05 NOTE — NURSING NOTE
Chief Complaint   Patient presents with    Follow Up       Vitals:    09/05/24 0848   BP: 126/82   Pulse: 66   SpO2: 100%   Weight: 65.5 kg (144 lb 4.8 oz)   Height: 1.524 m (5')       Body mass index is 28.18 kg/m .    Lino Angulo MA

## 2024-09-05 NOTE — PATIENT INSTRUCTIONS
It was a pleasure taking care of you today.  I've included a brief summary of our discussion and care plan from today's visit below.  Please review this information with your primary care provider.  _______________________________________________________________________    My recommendations are summarized as follows:    Colonoscopy - if you decide yes on this, please call to schedule at below number  Start daily gas-X, use per package instructions  We discussed physical therapy and dietary referral, let me know if you want me to put these in  CT scan of small intestines   You can try IB [employer] (order on Amazon) when the pain is present to see if that helps     Please call our clinic or send a [employer] message to us if you have any questions or concerns. MyChart messages are answered by your nurse or doctor typically within 24 hours.  Please allow extra time on weekends and holidays    Return to GI Clinic in 3 months to review your progress.    _______________________________________________________________________    How do I schedule labs, imaging studies, or procedures that were ordered in clinic today?      Labs: To schedule lab appointment at the [facility], use my chart or call [phone]. If you have a [place] lab closer to home where you are regularly seen you can give them a call.      Procedures: If a colonoscopy, upper endoscopy, breath test, esophageal manometry, or pH impedence was ordered today, our endoscopy team will call you to schedule this. If you have not heard from our endoscopy team within a week, please call [phone] option 2 to schedule.      Imaging Studies: If you were scheduled for a CT scan, X-ray, MRI, ultrasound, HIDA scan, EKG or other imaging study, please call [phone] to have this scheduled.      Referral: If a referral to another specialty was ordered, expect a phone call or follow instructions above. If you have not heard from anyone regarding your  referral in a week, please call our clinic to check the status.      Who do I call with any questions after my visit?  Please be in touch if there are any further questions that arise following today's visit.  There are multiple ways to contact your gastroenterology care team.       During business hours, you may reach a Gastroenterology nurse at [phone]     To schedule or reschedule an appointment, please call [phone].      You can always send a secure message through [employer].  [employer] messages are answered by your nurse or doctor typically within 24 hours.  Please allow extra time on weekends and holidays.       For urgent/emergent questions after business hours, you may reach the on-call GI Fellow by contacting the [facility] at [phone].     How will I get the results of any tests ordered?    You will receive all of your results.  If you have signed up for [employer], any tests ordered at your visit will be available to you after your physician reviews them.  Typically this takes 1-2 weeks.  If there are urgent results that require a change in your care plan, your physician or nurse will call you to discuss the next steps.       What is [employer]?  [employer] is a secure way for you to access all of your healthcare records from the [facility].  It is a web based computer program, so you can sign on to it from any location.  It also allows you to send secure messages to your care team.  I recommend signing up for [employer] access if you have not already done so and are comfortable with using a computer.       How to I schedule a follow-up visit?  If you did not schedule a follow-up visit today, please call [phone] to schedule a follow-up office visit.      Sincerely,    Henny Zambrano PA-C  Gastroenterology

## 2024-09-23 ENCOUNTER — TELEPHONE (OUTPATIENT)
Dept: GASTROENTEROLOGY | Facility: CLINIC | Age: 39
End: 2024-09-23
Payer: COMMERCIAL

## 2024-09-23 NOTE — TELEPHONE ENCOUNTER
Left Voicemail (1st Attempt) and Sent Mychart (1st Attempt) for the patient to call back and schedule the following:    Appointment type: Return GI  Provider: Henny Zambrano  Return date: 3 mo (around Dec 2024)  Specialty phone number: [phone]  Additional appointment(s) needed: NA  Additonal Notes: LVM/MyC x1

## 2024-09-25 NOTE — TELEPHONE ENCOUNTER
Left Voicemail (2nd Attempt) and Sent Mychart (2nd Attempt) for the patient to call back and schedule the following:    Appointment type: Return GI  Provider: Henny Zambrano  Return date: 3 mo (around Dec 2024)  Specialty phone number: [phone]  Additional appointment(s) needed: NA  Additonal Notes: LVMx2/MyC x2

## 2024-09-27 ENCOUNTER — PATIENT OUTREACH (OUTPATIENT)
Dept: CARE COORDINATION | Facility: CLINIC | Age: 39
End: 2024-09-27
Payer: COMMERCIAL

## 2024-09-27 ENCOUNTER — HOSPITAL ENCOUNTER (OUTPATIENT)
Dept: CT IMAGING | Facility: CLINIC | Age: 39
Discharge: HOME | End: 2024-09-27
Attending: PHYSICIAN ASSISTANT | Admitting: PHYSICIAN ASSISTANT
Payer: COMMERCIAL

## 2024-09-27 DIAGNOSIS — R10.12 ABDOMINAL PAIN, LEFT UPPER QUADRANT: ICD-10-CM

## 2024-09-27 DIAGNOSIS — R10.32 ABDOMINAL PAIN, LEFT LOWER QUADRANT: ICD-10-CM

## 2024-09-27 PROCEDURE — 250N000009 HC RX 250: Performed by: PHYSICIAN ASSISTANT

## 2024-09-27 PROCEDURE — 74177 CT ABD & PELVIS W/CONTRAST: CPT

## 2024-09-27 PROCEDURE — 250N000011 HC RX IP 250 OP 636: Performed by: PHYSICIAN ASSISTANT

## 2024-09-27 RX ORDER — IOPAMIDOL 755 MG/ML
500 INJECTION, SOLUTION INTRAVASCULAR ONCE
Status: COMPLETED | OUTPATIENT
Start: 2024-09-27 | End: 2024-09-27

## 2024-09-27 RX ADMIN — IOPAMIDOL 72 ML: 755 INJECTION, SOLUTION INTRAVENOUS at 14:26

## 2024-09-27 RX ADMIN — SODIUM CHLORIDE 54 ML: 9 INJECTION, SOLUTION INTRAVENOUS at 14:26

## 2024-10-04 ENCOUNTER — TELEPHONE (OUTPATIENT)
Dept: GASTROENTEROLOGY | Facility: CLINIC | Age: 39
End: 2024-10-04
Payer: COMMERCIAL

## 2024-10-04 NOTE — TELEPHONE ENCOUNTER
"Endoscopy Scheduling Screen    Have you had any respiratory illness or flu-like symptoms in the last 10 days?  No    What is your communication preference for Instructions and/or Bowel Prep?   MyChart    What insurance is in the chart?  Other:  bcbs    Ordering/Referring Provider: Henny Zambrano   (If ordering provider performs procedure, schedule with ordering provider unless otherwise instructed. )    BMI: Estimated body mass index is 28.18 kg/m  as calculated from the following:    Height as of 9/5/24: 1.524 m (5').    Weight as of 9/5/24: 65.5 kg (144 lb 4.8 oz).     Sedation Ordered  moderate sedation.   If patient BMI > 50 do not schedule in [facility].    If patient BMI > 45 do not schedule at [facility].    Are you taking methadone or Suboxone?  NO, No RN review required.    Have you been diagnosed and are being treated for severe PTSD or severe anxiety?  NO, No RN review required.    Are you taking any prescription medications for pain 3 or more times per week?   NO, No RN review required.    Do you have a history of malignant hyperthermia?  No    (Females) Are you currently pregnant?   No     Have you been diagnosed or told you have pulmonary hypertension?   No    Do you have an LVAD?  No    Have you been told you have moderate to severe sleep apnea?  No.    Have you been told you have COPD, asthma, or any other lung disease?  No    Do you have any heart conditions?  No     Have you ever had or are you waiting for an organ transplant?  No. Continue scheduling, no site restrictions.    Have you had a stroke or transient ischemic attack (TIA aka \"mini stroke\" in the last 6 months?   No    Have you been diagnosed with or been told you have cirrhosis of the liver?   No.    Are you currently on dialysis?   No    Do you need assistance transferring?   No    BMI: Estimated body mass index is 28.18 kg/m  as calculated from the following:    Height as of 9/5/24: 1.524 m (5').    Weight as of 9/5/24: 65.5 kg (144 lb 4.8 oz).     Is " patients BMI > 40 and scheduling location [facility]?  No    Do you take an injectable or oral medication for weight loss or diabetes (excluding insulin)?  No    Do you take the medication Naltrexone?  No    Do you take blood thinners?  No       Prep   Are you currently on dialysis or do you have chronic kidney disease?  No    Do you have a diagnosis of diabetes?  No    Do you have a diagnosis of cystic fibrosis (CF)?  No    On a regular basis do you go 3 -5 days between bowel movements?  No    BMI > 40?  No    Preferred Pharmacy:    [id number] - [place], [place] - [address] AT [facility] [address]  [place] [facility] [address]  Phone: [phone] Fax: [phone]      Final Scheduling Details     Procedure scheduled  Colonoscopy    Surgeon:  Alexia     Date of procedure:  11/18     Pre-OP / PAC:   No - Not required for this site.    Location  [facility] - [facility] - Per order.    Sedation   Moderate Sedation - Per order.      Patient Reminders:   You will receive a call from a Nurse to review instructions and health history.  This assessment must be completed prior to your procedure.  Failure to complete the Nurse assessment may result in the procedure being cancelled.      On the day of your procedure, please designate an adult(s) who can drive you home stay with you for the next 24 hours. The medicines used in the exam will make you sleepy. You will not be able to drive.      You cannot take public transportation, ride share services, or non-medical taxi service without a responsible caregiver.  Medical transport services are allowed with the requirement that a responsible caregiver will receive you at your destination.  We require that drivers and caregivers are confirmed prior to your procedure.

## 2024-11-05 ENCOUNTER — TELEPHONE (OUTPATIENT)
Dept: GASTROENTEROLOGY | Facility: CLINIC | Age: 39
End: 2024-11-05
Payer: COMMERCIAL

## 2024-11-05 NOTE — TELEPHONE ENCOUNTER
Attempted to contact patient in order to complete pre assessment questions.     Patient scheduled for Colonoscopy on 11/18/24.     No answer. Left message to return call to [phone] option 2.    Callback required communication sent via [place].      Sandra Crook RN  Endoscopy Procedure Pre Assessment

## 2024-11-05 NOTE — TELEPHONE ENCOUNTER
Pre visit planning completed.      Procedure details:    Patient scheduled for Colonoscopy on 11/18/24.     Arrival time: 0845. Procedure time 0945    Facility location: [facility]; [address] [place] [address], [address] Floor, [place] [address]. Check in location: 5th Floor.    Sedation type: Conscious sedation     Pre op exam needed? No.    Indication for procedure:   Abdominal pain, left upper quadrant [R10.12]  - Primary      Abdominal pain, left lower quadrant            Chart review:     Electronic implanted devices? No    Recent diagnosis of diverticulitis within the last 6 weeks? No      Medication review:    Diabetic? No    Anticoagulants? No    Weight loss medication/injectable? No GLP-1 medication per patient's medication list.  RN will verify with pre-assessment call.    Other medication HOLDING recommendations:  N/A      Prep for procedure:     Bowel prep recommendation: Standard Miralax  Due to: standard bowel prep.    Prep instructions sent via munira Crook RN  Endoscopy Procedure Pre Assessment RN  [phone] option 2

## 2024-11-06 NOTE — TELEPHONE ENCOUNTER
Pre assessment completed for upcoming procedure.   (Please see previous telephone encounter notes for complete details)    Patient  returned call.       Procedure details:    Arrival time and facility location reviewed.    Pre op exam needed? No.    Designated  policy reviewed. Instructed to have someone stay 6  hours post procedure.       Medication review:    Medications reviewed. Please see supporting documentation below. Holding recommendations discussed (if applicable).       Prep for procedure:     Procedure prep instructions reviewed.        Any additional information needed:  N/A      Patient  verbalized understanding and had no questions or concerns at this time.      Mariza Mejia RN  Endoscopy Procedure Pre Assessment   [phone] option 2

## 2024-11-17 ENCOUNTER — ANESTHESIA EVENT (OUTPATIENT)
Dept: SURGERY | Facility: AMBULATORY SURGERY CENTER | Age: 39
End: 2024-11-17
Payer: COMMERCIAL

## 2024-11-18 ENCOUNTER — HOSPITAL ENCOUNTER (OUTPATIENT)
Facility: AMBULATORY SURGERY CENTER | Age: 39
Discharge: HOME | End: 2024-11-18
Attending: INTERNAL MEDICINE
Payer: COMMERCIAL

## 2024-11-18 ENCOUNTER — ANESTHESIA (OUTPATIENT)
Dept: SURGERY | Facility: AMBULATORY SURGERY CENTER | Age: 39
End: 2024-11-18
Payer: COMMERCIAL

## 2024-11-18 VITALS
DIASTOLIC BLOOD PRESSURE: 82 MMHG | HEIGHT: 60 IN | WEIGHT: 135 LBS | BODY MASS INDEX: 26.5 KG/M2 | OXYGEN SATURATION: 99 % | TEMPERATURE: 97.1 F | SYSTOLIC BLOOD PRESSURE: 113 MMHG | RESPIRATION RATE: 16 BRPM | HEART RATE: 70 BPM

## 2024-11-18 VITALS — HEART RATE: 60 BPM

## 2024-11-18 LAB — COLONOSCOPY: NORMAL

## 2024-11-18 RX ORDER — FENTANYL CITRATE 50 UG/ML
INJECTION, SOLUTION INTRAMUSCULAR; INTRAVENOUS PRN
Status: DISCONTINUED | OUTPATIENT
Start: 2024-11-18 | End: 2024-11-18 | Stop reason: HOSPADM

## 2024-11-18 RX ORDER — SODIUM CHLORIDE, SODIUM LACTATE, POTASSIUM CHLORIDE, CALCIUM CHLORIDE 600; 310; 30; 20 MG/100ML; MG/100ML; MG/100ML; MG/100ML
INJECTION, SOLUTION INTRAVENOUS CONTINUOUS
Status: DISCONTINUED | OUTPATIENT
Start: 2024-11-18 | End: 2024-11-18 | Stop reason: HOSPADM

## 2024-11-18 RX ORDER — NALOXONE HYDROCHLORIDE 0.4 MG/ML
0.4 INJECTION, SOLUTION INTRAMUSCULAR; INTRAVENOUS; SUBCUTANEOUS
Status: DISCONTINUED | OUTPATIENT
Start: 2024-11-18 | End: 2024-11-19 | Stop reason: HOSPADM

## 2024-11-18 RX ORDER — ONDANSETRON 2 MG/ML
4 INJECTION INTRAMUSCULAR; INTRAVENOUS
Status: DISCONTINUED | OUTPATIENT
Start: 2024-11-18 | End: 2024-11-18 | Stop reason: HOSPADM

## 2024-11-18 RX ORDER — PROCHLORPERAZINE MALEATE 10 MG
10 TABLET ORAL EVERY 6 HOURS PRN
Status: DISCONTINUED | OUTPATIENT
Start: 2024-11-18 | End: 2024-11-19 | Stop reason: HOSPADM

## 2024-11-18 RX ORDER — ONDANSETRON 2 MG/ML
4 INJECTION INTRAMUSCULAR; INTRAVENOUS EVERY 6 HOURS PRN
Status: DISCONTINUED | OUTPATIENT
Start: 2024-11-18 | End: 2024-11-19 | Stop reason: HOSPADM

## 2024-11-18 RX ORDER — FLUMAZENIL 0.1 MG/ML
0.2 INJECTION, SOLUTION INTRAVENOUS
Status: ACTIVE | OUTPATIENT
Start: 2024-11-18 | End: 2024-11-18

## 2024-11-18 RX ORDER — LIDOCAINE 40 MG/G
CREAM TOPICAL
Status: DISCONTINUED | OUTPATIENT
Start: 2024-11-18 | End: 2024-11-18 | Stop reason: HOSPADM

## 2024-11-18 RX ORDER — NALOXONE HYDROCHLORIDE 0.4 MG/ML
0.2 INJECTION, SOLUTION INTRAMUSCULAR; INTRAVENOUS; SUBCUTANEOUS
Status: DISCONTINUED | OUTPATIENT
Start: 2024-11-18 | End: 2024-11-19 | Stop reason: HOSPADM

## 2024-11-18 RX ORDER — ONDANSETRON 4 MG/1
4 TABLET, ORALLY DISINTEGRATING ORAL EVERY 6 HOURS PRN
Status: DISCONTINUED | OUTPATIENT
Start: 2024-11-18 | End: 2024-11-19 | Stop reason: HOSPADM

## 2024-11-18 RX ADMIN — SODIUM CHLORIDE, SODIUM LACTATE, POTASSIUM CHLORIDE, CALCIUM CHLORIDE: 600; 310; 30; 20 INJECTION, SOLUTION INTRAVENOUS at 09:38

## 2024-11-18 NOTE — ANESTHESIA PREPROCEDURE EVALUATION
"Anesthesia Pre-Procedure Evaluation    Patient: Felicia Mcallister   MRN: [id number] : 1985        Procedure : Procedure(s):  Colonoscopy          Past Medical History:   Diagnosis Date    Depressive disorder       Past Surgical History:   Procedure Laterality Date     SECTION N/A 3/20/2020    Procedure:  SECTION;  Surgeon: Jens To MD;  Location: [facility];  Service: Obstetrics     SECTION N/A 2022    Procedure: REPEAT  SECTION;  Surgeon: Jens To MD;  Location: [facility]      No Known Allergies   Social History     Tobacco Use    Smoking status: Never     Passive exposure: Never    Smokeless tobacco: Never   Substance Use Topics    Alcohol use: Not Currently      Wt Readings from Last 1 Encounters:   24 65.5 kg (144 lb 4.8 oz)              OUTSIDE LABS:  CBC:   Lab Results   Component Value Date    WBC 5.6 2024    WBC 4.3 2023    HGB 14.2 2024    HGB 13.1 2023    HCT 41.1 2024    HCT 38.1 2023     2024     2023     BMP:   Lab Results   Component Value Date     2024     2023    POTASSIUM 4.0 2024    POTASSIUM 3.6 2023    CHLORIDE 101 2024    CHLORIDE 105 2023    CO2 24 2024    CO2 26 2023    BUN 6.3 2024    BUN 6.0 2023    CR 0.49 (L) 2024    CR 0.56 2023     (H) 2024     (H) 2023     COAGS: No results found for: \"PTT\", \"INR\", \"FIBR\"  POC: No results found for: \"BGM\", \"HCG\", \"HCGS\"  HEPATIC:   Lab Results   Component Value Date    ALBUMIN 5.1 2024    PROTTOTAL 8.0 2024    ALT 48 2024    AST 36 2024    ALKPHOS 61 2024    BILITOTAL 0.9 2024     OTHER:   Lab Results   Component Value Date    A1C 5.4 2023    JASMIN 10.2 (H) 2024    TSH 1.07 2023              Lizzette Rausch MD    I have reviewed the " pertinent notes and labs in the chart from the past 30 days and (re)examined the patient.  Any updates or changes from those notes are reflected in this note.

## 2025-02-26 ENCOUNTER — OFFICE VISIT (OUTPATIENT)
Dept: FAMILY MEDICINE | Facility: CLINIC | Age: 40
End: 2025-02-26
Payer: COMMERCIAL

## 2025-02-26 VITALS
TEMPERATURE: 98.6 F | RESPIRATION RATE: 16 BRPM | OXYGEN SATURATION: 98 % | BODY MASS INDEX: 29.25 KG/M2 | HEIGHT: 60 IN | SYSTOLIC BLOOD PRESSURE: 110 MMHG | HEART RATE: 82 BPM | WEIGHT: 149 LBS | DIASTOLIC BLOOD PRESSURE: 76 MMHG

## 2025-02-26 DIAGNOSIS — R10.84 ABDOMINAL PAIN, GENERALIZED: ICD-10-CM

## 2025-02-26 DIAGNOSIS — Z00.00 ROUTINE GENERAL MEDICAL EXAMINATION AT A HEALTH CARE FACILITY: Primary | ICD-10-CM

## 2025-02-26 DIAGNOSIS — Z12.31 VISIT FOR SCREENING MAMMOGRAM: ICD-10-CM

## 2025-02-26 LAB
ALBUMIN SERPL BCG-MCNC: 4.8 G/DL (ref 3.5–5.2)
ALP SERPL-CCNC: 84 U/L (ref 40–150)
ALT SERPL W P-5'-P-CCNC: 127 U/L (ref 0–50)
ANION GAP SERPL CALCULATED.3IONS-SCNC: 13 MMOL/L (ref 7–15)
AST SERPL W P-5'-P-CCNC: 94 U/L (ref 0–45)
BILIRUB SERPL-MCNC: 1 MG/DL
BUN SERPL-MCNC: 6.5 MG/DL (ref 6–20)
CALCIUM SERPL-MCNC: 10.1 MG/DL (ref 8.8–10.4)
CHLORIDE SERPL-SCNC: 102 MMOL/L (ref 98–107)
CHOLEST SERPL-MCNC: 261 MG/DL
CREAT SERPL-MCNC: 0.46 MG/DL (ref 0.51–0.95)
EGFRCR SERPLBLD CKD-EPI 2021: >90 ML/MIN/1.73M2
ERYTHROCYTE [DISTWIDTH] IN BLOOD BY AUTOMATED COUNT: 11.6 % (ref 10–15)
FASTING STATUS PATIENT QL REPORTED: ABNORMAL
FASTING STATUS PATIENT QL REPORTED: ABNORMAL
GLUCOSE SERPL-MCNC: 116 MG/DL (ref 70–99)
HCO3 SERPL-SCNC: 25 MMOL/L (ref 22–29)
HCT VFR BLD AUTO: 39.6 % (ref 35–47)
HDLC SERPL-MCNC: 57 MG/DL
HGB BLD-MCNC: 13.8 G/DL (ref 11.7–15.7)
LDLC SERPL CALC-MCNC: 141 MG/DL
MCH RBC QN AUTO: 31.6 PG (ref 26.5–33)
MCHC RBC AUTO-ENTMCNC: 34.8 G/DL (ref 31.5–36.5)
MCV RBC AUTO: 91 FL (ref 78–100)
NONHDLC SERPL-MCNC: 204 MG/DL
PLATELET # BLD AUTO: 308 10E3/UL (ref 150–450)
POTASSIUM SERPL-SCNC: 3.6 MMOL/L (ref 3.4–5.3)
PROT SERPL-MCNC: 7.9 G/DL (ref 6.4–8.3)
RBC # BLD AUTO: 4.37 10E6/UL (ref 3.8–5.2)
SODIUM SERPL-SCNC: 140 MMOL/L (ref 135–145)
TRIGL SERPL-MCNC: 313 MG/DL
TSH SERPL DL<=0.005 MIU/L-ACNC: 2.02 UIU/ML (ref 0.3–4.2)
WBC # BLD AUTO: 4.5 10E3/UL (ref 4–11)

## 2025-02-26 PROCEDURE — 3074F SYST BP LT 130 MM HG: CPT | Performed by: FAMILY MEDICINE

## 2025-02-26 PROCEDURE — 90746 HEPB VACCINE 3 DOSE ADULT IM: CPT | Performed by: FAMILY MEDICINE

## 2025-02-26 PROCEDURE — G2211 COMPLEX E/M VISIT ADD ON: HCPCS | Performed by: FAMILY MEDICINE

## 2025-02-26 PROCEDURE — 99213 OFFICE O/P EST LOW 20 MIN: CPT | Mod: 25 | Performed by: FAMILY MEDICINE

## 2025-02-26 PROCEDURE — 80061 LIPID PANEL: CPT | Performed by: FAMILY MEDICINE

## 2025-02-26 PROCEDURE — 90471 IMMUNIZATION ADMIN: CPT | Performed by: FAMILY MEDICINE

## 2025-02-26 PROCEDURE — 36415 COLL VENOUS BLD VENIPUNCTURE: CPT | Performed by: FAMILY MEDICINE

## 2025-02-26 PROCEDURE — 99396 PREV VISIT EST AGE 40-64: CPT | Mod: 25 | Performed by: FAMILY MEDICINE

## 2025-02-26 PROCEDURE — 85027 COMPLETE CBC AUTOMATED: CPT | Performed by: FAMILY MEDICINE

## 2025-02-26 PROCEDURE — 80053 COMPREHEN METABOLIC PANEL: CPT | Performed by: FAMILY MEDICINE

## 2025-02-26 PROCEDURE — 3078F DIAST BP <80 MM HG: CPT | Performed by: FAMILY MEDICINE

## 2025-02-26 PROCEDURE — 84443 ASSAY THYROID STIM HORMONE: CPT | Performed by: FAMILY MEDICINE

## 2025-02-26 SDOH — HEALTH STABILITY: PHYSICAL HEALTH: ON AVERAGE, HOW MANY DAYS PER WEEK DO YOU ENGAGE IN MODERATE TO STRENUOUS EXERCISE (LIKE A BRISK WALK)?: 1 DAY

## 2025-02-26 ASSESSMENT — SOCIAL DETERMINANTS OF HEALTH (SDOH): HOW OFTEN DO YOU GET TOGETHER WITH FRIENDS OR RELATIVES?: ONCE A WEEK

## 2025-02-26 NOTE — PROGRESS NOTES
Preventive Care Visit  [facility]  Yasmine Meléndez MD, Family Medicine  Feb 26, 2025      Assessment & Plan     (Z00.00) Routine general medical examination at a health care facility  (primary encounter diagnosis)  Comment: encourage annual pe and vaccine up date  Plan: Lipid panel reflex to direct LDL Fasting,         Comprehensive metabolic panel (BMP + Alb, Alk         Phos, ALT, AST, Total. Bili, TP), CBC with         platelets, TSH with free T4 reflex            (R10.84) Abdominal pain, generalized  Comment: pt has chronic abd pain at LUQ and epigastric area. Started at her first pregnant 5 years ago, then got worse 3 years ago at her second time pregnant. She has daily pain on LUQ and dull pressure on epigastric area. No radiation. At night when she lay down it is worse. Sometimes she felt sob. No fever, n/v/d/c, black stool and weight loss. Denies smoke, spicy food and alcohol. She drink coffee one cup a day. Pt is worried about her pancreatic and heart.   Reviewed the chart pt has ct at 2023 and 2024 with Hepatic steatosis.  And Cholelithiasis. Lab with mild elevated bilirubin sometimes. Otherwise not remarkable.   She had consult with gi and had treatment for h pylori. She also tried prilosec but not help.   Exam is not remarkable.   Likely she has chronic gastritis.   Plan: advise to avoid spicy food, alcohol. Advise to elevate the pillow. Advise continue follow-up with gi. She agrees to call gi for follow-up appointment.     (Z12.31) Visit for screening mammogram  Comment: addressed breast cancer screening  Plan: pt declined mammogram.     Patient has been advised of split billing requirements and indicates understanding: Yes        BMI  Estimated body mass index is 29.1 kg/m  as calculated from the following:    Height as of this encounter: 1.524 m (5').    Weight as of this encounter: 67.6 kg (149 lb).   Weight management plan: Discussed healthy diet and exercise  guidelines    Counseling  Appropriate preventive services were addressed with this patient via screening, questionnaire, or discussion as appropriate for fall prevention, nutrition, physical activity, Tobacco-use cessation, social engagement, weight loss and cognition.  Checklist reviewing preventive services available has been given to the patient.  Reviewed patient's diet, addressing concerns and/or questions.   She is at risk for lack of exercise and has been provided with information to increase physical activity for the benefit of her well-being.   The patient was instructed to see the dentist every 6 months.   She is at risk for psychosocial distress and has been provided with information to reduce risk.       See Patient Instructions    Suha Duarte is a 39 year old, presenting for the following:  Physical (fasting) and Chest Pain (For months having chest heaviness in the center of her chest mostly happens at night when she is laying down)        2/26/2025     9:09 AM   Additional Questions   Roomed by Gen DANIEL CMA        Via the Health Maintenance questionnaire, the patient has reported the following services have been completed -Mammogram: [facility] 2022-08-11, this information has not been sent to the abstraction team.    HPI          Health Care Directive  Patient does not have a Health Care Directive: Discussed advance care planning with patient; however, patient declined at this time.      2/26/2025   General Health   How would you rate your overall physical health? (!) FAIR   Feel stress (tense, anxious, or unable to sleep) To some extent   (!) STRESS CONCERN      2/26/2025   Nutrition   Three or more servings of calcium each day? (!) NO   Diet: Regular (no restrictions)   How many servings of fruit and vegetables per day? (!) 0-1   How many sweetened beverages each day? (!) 2         2/26/2025   Exercise   Days per week of moderate/strenous exercise 1 day   (!) EXERCISE CONCERN       2/26/2025   Social Factors   Frequency of gathering with friends or relatives Once a week   Worry food won't last until get money to buy more No   Food not last or not have enough money for food? Yes   Do you have housing? (Housing is defined as stable permanent housing and does not include staying ouside in a car, in a tent, in an abandoned building, in an overnight shelter, or couch-surfing.) Yes   Are you worried about losing your housing? No   Lack of transportation? No   Unable to get utilities (heat,electricity)? No   (!) FOOD SECURITY CONCERN PRESENT      2/26/2025   Dental   Dentist two times every year? (!) NO            Today's PHQ-2 Score:       2/26/2025     9:00 AM   PHQ-2 ( 1999 [employer])   Q1: Little interest or pleasure in doing things 1   Q2: Feeling down, depressed or hopeless 1   PHQ-2 Score 2    Q1: Little interest or pleasure in doing things Several days   Q2: Feeling down, depressed or hopeless Several days   PHQ-2 Score 2       Patient-reported           2/26/2025   Substance Use   Alcohol more than 3/day or more than 7/wk No   Do you use any other substances recreationally? No     Social History     Tobacco Use    Smoking status: Never     Passive exposure: Never    Smokeless tobacco: Never   Vaping Use    Vaping status: Never Used   Substance Use Topics    Alcohol use: Not Currently    Drug use: Never           9/29/2022   LAST FHS-7 RESULTS   1st degree relative breast or ovarian cancer No   Any relative bilateral breast cancer No   Any male have breast cancer No   Any ONE woman have BOTH breast AND ovarian cancer No   Any woman with breast cancer before 50yrs No   2 or more relatives with breast AND/OR ovarian cancer No   2 or more relatives with breast AND/OR bowel cancer No        Mammogram Screening - Mammogram every 1-2 years updated in Health Maintenance based on mutual decision making        2/26/2025   STI Screening   New sexual partner(s) since last STI/HIV test? No     History of  abnormal Pap smear: No - age 30- 64 PAP with HPV every 5 years recommended        9/10/2020    12:00 AM 8/29/2019    12:00 AM   PAP / HPV   HPV_EXT - HISTORICAL See Scanned Report  See Scanned Report      ASCVD Risk   The 10-year ASCVD risk score (Obed BIANCHI, et al., 2019) is: 0.6%    Values used to calculate the score:      Age: 39 years      Sex: Female      Is Non- : No      Diabetic: No      Tobacco smoker: No      Systolic Blood Pressure: 110 mmHg      Is BP treated: No      HDL Cholesterol: 52 mg/dL      Total Cholesterol: 214 mg/dL        2/26/2025   Contraception/Family Planning   Questions about contraception or family planning No        Reviewed and updated as needed this visit by Provider   Tobacco  Allergies  Meds  Problems  Med Hx  Surg Hx  Fam Hx            Lab work is in process  Labs reviewed in EPIC      Review of Systems  Constitutional, HEENT, cardiovascular, pulmonary, GI, , musculoskeletal, neuro, skin, endocrine and psych systems are negative, except as otherwise noted.     Objective    Exam  /76 (BP Location: Left arm, Patient Position: Sitting, Cuff Size: Adult Regular)   Pulse 82   Temp 98.6  F (37  C) (Oral)   Resp 16   Ht 1.524 m (5')   Wt 67.6 kg (149 lb)   LMP 02/03/2025   SpO2 98%   Breastfeeding No   BMI 29.10 kg/m     Estimated body mass index is 29.1 kg/m  as calculated from the following:    Height as of this encounter: 1.524 m (5').    Weight as of this encounter: 67.6 kg (149 lb).    Physical Exam  GENERAL: alert and no distress  EYES: Eyes grossly normal to inspection, PERRL and conjunctivae and sclerae normal  HENT: ear canals and TM's normal, nose and mouth without ulcers or lesions  NECK: no adenopathy, no asymmetry, masses, or scars  RESP: lungs clear to auscultation - no rales, rhonchi or wheezes  BREAST: normal without masses, tenderness or nipple discharge and no palpable axillary masses or adenopathy  CV: regular  rate and rhythm, normal S1 S2, no S3 or S4, no murmur, click or rub, no peripheral edema  ABDOMEN: soft, nontender, no hepatosplenomegaly, no masses and bowel sounds normal  MS: no gross musculoskeletal defects noted, no edema  SKIN: no suspicious lesions or rashes  NEURO: Normal strength and tone, mentation intact and speech normal  PSYCH: mentation appears normal, affect normal/bright        Signed Electronically by: Yasmine Meléndez MD

## 2025-02-26 NOTE — PATIENT INSTRUCTIONS
Patient Education   Preventive Care Advice   This is general advice given by our system to help you stay healthy. However, your care team may have specific advice just for you. Please talk to your care team about your preventive care needs.  Nutrition  Eat 5 or more servings of fruits and vegetables each day.  Try wheat bread, brown rice and whole grain pasta (instead of white bread, rice, and pasta).  Get enough calcium and vitamin D. Check the label on foods and aim for 100% of the RDA (recommended daily allowance).  Lifestyle  Exercise at least 150 minutes each week  (30 minutes a day, 5 days a week).  Do muscle strengthening activities 2 days a week. These help control your weight and prevent disease.  No smoking.  Wear sunscreen to prevent skin cancer.  Have a dental exam and cleaning every 6 months.  Yearly exams  See your health care team every year to talk about:  Any changes in your health.  Any medicines your care team has prescribed.  Preventive care, family planning, and ways to prevent chronic diseases.  Shots (vaccines)   HPV shots (up to age 26), if you've never had them before.  Hepatitis B shots (up to age 59), if you've never had them before.  COVID-19 shot: Get this shot when it's due.  Flu shot: Get a flu shot every year.  Tetanus shot: Get a tetanus shot every 10 years.  Pneumococcal, hepatitis A, and RSV shots: Ask your care team if you need these based on your risk.  Shingles shot (for age 50 and up)  General health tests  Diabetes screening:  Starting at age 35, Get screened for diabetes at least every 3 years.  If you are younger than age 35, ask your care team if you should be screened for diabetes.  Cholesterol test: At age 39, start having a cholesterol test every 5 years, or more often if advised.  Bone density scan (DEXA): At age 50, ask your care team if you should have this scan for osteoporosis (brittle bones).  Hepatitis C: Get tested at least once in your life.  STIs (sexually  transmitted infections)  Before age 24: Ask your care team if you should be screened for STIs.  After age 24: Get screened for STIs if you're at risk. You are at risk for STIs (including HIV) if:  You are sexually active with more than one person.  You don't use condoms every time.  You or a partner was diagnosed with a sexually transmitted infection.  If you are at risk for HIV, ask about PrEP medicine to prevent HIV.  Get tested for HIV at least once in your life, whether you are at risk for HIV or not.  Cancer screening tests  Cervical cancer screening: If you have a cervix, begin getting regular cervical cancer screening tests starting at age 21.  Breast cancer scan (mammogram): If you've ever had breasts, begin having regular mammograms starting at age 40. This is a scan to check for breast cancer.  Colon cancer screening: It is important to start screening for colon cancer at age 45.  Have a colonoscopy test every 10 years (or more often if you're at risk) Or, ask your provider about stool tests like a FIT test every year or Cologuard test every 3 years.  To learn more about your testing options, visit:   .  For help making a decision, visit:   https://bit.ly/qs97629.  Prostate cancer screening test: If you have a prostate, ask your care team if a prostate cancer screening test (PSA) at age 55 is right for you.  Lung cancer screening: If you are a current or former smoker ages 50 to 80, ask your care team if ongoing lung cancer screenings are right for you.  For informational purposes only. Not to replace the advice of your health care provider. Copyright   2023 [facility]. All rights reserved. Clinically reviewed by the [facility]. [employer] 618956 - REV 01/24.  Learning About Stress  What is stress?     Stress is your body's response to a hard situation. Your body can have a physical, emotional, or mental response. Stress is a fact of life for most people, and it  affects everyone differently. What causes stress for you may not be stressful for someone else.  A lot of things can cause stress. You may feel stress when you go on a job interview, take a test, or run a race. This kind of short-term stress is normal and even useful. It can help you if you need to work hard or react quickly. For example, stress can help you finish an important job on time.  Long-term stress is caused by ongoing stressful situations or events. Examples of long-term stress include long-term health problems, ongoing problems at work, or conflicts in your family. Long-term stress can harm your health.  How does stress affect your health?  When you are stressed, your body responds as though you are in danger. It makes hormones that speed up your heart, make you breathe faster, and give you a burst of energy. This is called the fight-or-flight stress response. If the stress is over quickly, your body goes back to normal and no harm is done.  But if stress happens too often or lasts too long, it can have bad effects. Long-term stress can make you more likely to get sick, and it can make symptoms of some diseases worse. If you tense up when you are stressed, you may develop neck, shoulder, or low back pain. Stress is linked to high blood pressure and heart disease.  Stress also harms your emotional health. It can make you nunez, tense, or depressed. Your relationships may suffer, and you may not do well at work or school.  What can you do to manage stress?  You can try these things to help manage stress:   Do something active. Exercise or activity can help reduce stress. Walking is a great way to get started. Even everyday activities such as housecleaning or yard work can help.  Try yoga or anival chi. These techniques combine exercise and meditation. You may need some training at first to learn them.  Do something you enjoy. For example, listen to music or go to a movie. Practice your hobby or do volunteer  "work.  Meditate. This can help you relax, because you are not worrying about what happened before or what may happen in the future.  Do guided imagery. Imagine yourself in any setting that helps you feel calm. You can use online videos, books, or a teacher to guide you.  Do breathing exercises. For example:  From a standing position, bend forward from the waist with your knees slightly bent. Let your arms dangle close to the floor.  Breathe in slowly and deeply as you return to a standing position. Roll up slowly and lift your head last.  Hold your breath for just a few seconds in the standing position.  Breathe out slowly and bend forward from the waist.  Let your feelings out. Talk, laugh, cry, and express anger when you need to. Talking with supportive friends or family, a counselor, or a michel leader about your feelings is a healthy way to relieve stress. Avoid discussing your feelings with people who make you feel worse.  Write. It may help to write about things that are bothering you. This helps you find out how much stress you feel and what is causing it. When you know this, you can find better ways to cope.  What can you do to prevent stress?  You might try some of these things to help prevent stress:  Manage your time. This helps you find time to do the things you want and need to do.  Get enough sleep. Your body recovers from the stresses of the day while you are sleeping.  Get support. Your family, friends, and community can make a difference in how you experience stress.  Limit your news feed. Avoid or limit time on social media or news that may make you feel stressed.  Do something active. Exercise or activity can help reduce stress. Walking is a great way to get started.  Where can you learn more?  Go to https://www.[employer].net/patiented  Enter N032 in the search box to learn more about \"Learning About Stress.\"  Current as of: October 24, 2023  Content Version: 14.3    2024 [employer]. "   Care instructions adapted under license by your healthcare professional. If you have questions about a medical condition or this instruction, always ask your healthcare professional. [employer] disclaims any warranty or liability for your use of this information.

## 2025-02-27 ENCOUNTER — TELEPHONE (OUTPATIENT)
Dept: FAMILY MEDICINE | Facility: CLINIC | Age: 40
End: 2025-02-27
Payer: COMMERCIAL

## 2025-02-27 NOTE — TELEPHONE ENCOUNTER
Left message to return call. If patient calls back, please route to [facility].     TCs, please send to RNs.    Emilia Zepeda RN  [facility]

## 2025-02-27 NOTE — TELEPHONE ENCOUNTER
Spoke to patient and relayed provider results. Patient verbalized understanding and is in agreement with plan.     Patient scheduled for follow up appointment on 5/19/25.     Alondra Mauro RN  [facility]

## 2025-02-27 NOTE — TELEPHONE ENCOUNTER
----- Message from Yasmine Meléndez sent at 2/27/2025  6:58 AM CST -----  Please call for all lab results      1) normal thyroid and kidney function. Normal blood cell count  2) elevated fasting glucose. You are prediabetes. Advise diet control.  3) elevated liver enzyme ast and alt. Likely due to fatty liver. Advise to have an office follow-up in 3 month.   4) high triglycerides and ldl. Strongly advise regular exercise and low fat diet. 10 years ascvd risk score is 0.7% and no medication is recommended at this moment. Will recheck  in 3 month.     The 10-year ASCVD risk score (Obed DK, et al., 2019) is: 0.7%    Values used to calculate the score:      Age: 39 years      Sex: Female      Is Non- : No      Diabetic: No      Tobacco smoker: No      Systolic Blood Pressure: 110 mmHg      Is BP treated: No      HDL Cholesterol: 57 mg/dL      Total Cholesterol: 261 mg/dL    Yasmine Meléndez MD on 2/27/2025 at 6:56 AM;

## 2025-05-17 ENCOUNTER — HEALTH MAINTENANCE LETTER (OUTPATIENT)
Age: 40
End: 2025-05-17

## (undated) DEVICE — SOL WATER IRRIG 1000ML BOTTLE 2F7114

## (undated) DEVICE — ADH LIQUID MASTISOL TOPICAL VIAL 2-3ML 0523-48

## (undated) DEVICE — SPONGE LAP 18X18" X8435

## (undated) DEVICE — GOWN IMPERVIOUS 2XL BLUE

## (undated) DEVICE — GLOVE SURG PI ULTRA TOUCH M SZ 6-1/2 LF

## (undated) DEVICE — SOL NACL 0.9% IRRIG 1000ML BOTTLE 2F7124

## (undated) DEVICE — PACK MAJOR BASIN 673

## (undated) DEVICE — PAD INSERT MED PEACH 14X6.5 62550

## (undated) DEVICE — SOL WATER IRRIG 500ML BOTTLE 2F7113

## (undated) DEVICE — SU PLAIN 3-0 XLH  27" 52T

## (undated) DEVICE — PREP CHLORAPREP 26ML TINTED HI-LITE ORANGE 930815

## (undated) DEVICE — PLATE GROUNDING ADULT W/CORD 9165L

## (undated) DEVICE — TUBING SUCTION MEDI-VAC 1/4"X20' N620A

## (undated) DEVICE — SUTURE MONOCRYL+ 4-0 PS-2 27IN MCP426H

## (undated) DEVICE — CUSTOM PACK C-SECTION LHE

## (undated) DEVICE — SUCTION MANIFOLD NEPTUNE 2 SYS 1 PORT 702-025-000

## (undated) DEVICE — SUCTION TIP YANKAUER W/O VENT K86

## (undated) DEVICE — LINER PRINTED RED HAZARD 24X24 A4824PRRO

## (undated) DEVICE — DRSG STERI STRIP 1/2X4" R1547

## (undated) DEVICE — GLOVE UNDER INDICATOR PI SZ 7.0 LF 41670

## (undated) DEVICE — PACK MINOR SINGLE BASIN SSK3001

## (undated) DEVICE — GOWN IMPERVIOUS BREATHABLE SMART LG 89015

## (undated) DEVICE — UNDERPAD 30X30 BULK 949B10

## (undated) DEVICE — SUTURE PDS 0 60IN CTX+ LOOPED PDP990G

## (undated) DEVICE — KIT ENDO TURNOVER/PROCEDURE CARRY-ON 101822

## (undated) DEVICE — DRESSING MEPILEX BORDER POST-OP 4X10

## (undated) DEVICE — SPECIMEN CONTAINER 3OZ W/FORMALIN 59901

## (undated) DEVICE — DRAPE IOBAN 2 C-SECTION 3M 6697

## (undated) DEVICE — SUTURE MONOCRYL+ 0 CT-1 UND 18 MCP946H

## (undated) RX ORDER — MORPHINE SULFATE 1 MG/ML
INJECTION, SOLUTION EPIDURAL; INTRATHECAL; INTRAVENOUS
Status: DISPENSED
Start: 2022-07-01

## (undated) RX ORDER — ONDANSETRON 2 MG/ML
INJECTION INTRAMUSCULAR; INTRAVENOUS
Status: DISPENSED
Start: 2024-11-18

## (undated) RX ORDER — FENTANYL CITRATE 50 UG/ML
INJECTION, SOLUTION INTRAMUSCULAR; INTRAVENOUS
Status: DISPENSED
Start: 2022-07-01

## (undated) RX ORDER — FENTANYL CITRATE 50 UG/ML
INJECTION, SOLUTION INTRAMUSCULAR; INTRAVENOUS
Status: DISPENSED
Start: 2024-11-18

## (undated) RX ORDER — DIPHENHYDRAMINE HYDROCHLORIDE 50 MG/ML
INJECTION INTRAMUSCULAR; INTRAVENOUS
Status: DISPENSED
Start: 2024-11-18